# Patient Record
Sex: FEMALE | Race: OTHER | NOT HISPANIC OR LATINO | URBAN - METROPOLITAN AREA
[De-identification: names, ages, dates, MRNs, and addresses within clinical notes are randomized per-mention and may not be internally consistent; named-entity substitution may affect disease eponyms.]

---

## 2017-01-06 ENCOUNTER — OUTPATIENT (OUTPATIENT)
Dept: OUTPATIENT SERVICES | Facility: HOSPITAL | Age: 52
LOS: 1 days | End: 2017-01-06
Payer: MEDICAID

## 2017-01-06 ENCOUNTER — APPOINTMENT (OUTPATIENT)
Dept: PODIATRY | Facility: HOSPITAL | Age: 52
End: 2017-01-06

## 2017-01-06 VITALS
HEIGHT: 63 IN | HEART RATE: 73 BPM | DIASTOLIC BLOOD PRESSURE: 78 MMHG | WEIGHT: 96 LBS | RESPIRATION RATE: 14 BRPM | BODY MASS INDEX: 17.01 KG/M2 | SYSTOLIC BLOOD PRESSURE: 120 MMHG

## 2017-01-06 DIAGNOSIS — Z98.89 OTHER SPECIFIED POSTPROCEDURAL STATES: Chronic | ICD-10-CM

## 2017-01-06 DIAGNOSIS — M20.42 OTHER HAMMER TOE(S) (ACQUIRED), LEFT FOOT: ICD-10-CM

## 2017-01-06 DIAGNOSIS — M20.12 HALLUX VALGUS (ACQUIRED), LEFT FOOT: ICD-10-CM

## 2017-01-06 DIAGNOSIS — M79.609 PAIN IN UNSPECIFIED LIMB: ICD-10-CM

## 2017-01-06 DIAGNOSIS — M79.672 PAIN IN LEFT FOOT: ICD-10-CM

## 2017-01-06 PROCEDURE — G0463: CPT

## 2017-01-10 ENCOUNTER — FORM ENCOUNTER (OUTPATIENT)
Age: 52
End: 2017-01-10

## 2017-01-11 ENCOUNTER — OUTPATIENT (OUTPATIENT)
Dept: OUTPATIENT SERVICES | Facility: HOSPITAL | Age: 52
LOS: 1 days | End: 2017-01-11
Payer: MEDICAID

## 2017-01-11 ENCOUNTER — APPOINTMENT (OUTPATIENT)
Dept: ORTHOPEDIC SURGERY | Facility: HOSPITAL | Age: 52
End: 2017-01-11

## 2017-01-11 VITALS
HEIGHT: 63 IN | SYSTOLIC BLOOD PRESSURE: 120 MMHG | HEART RATE: 74 BPM | WEIGHT: 96 LBS | BODY MASS INDEX: 17.01 KG/M2 | DIASTOLIC BLOOD PRESSURE: 76 MMHG | RESPIRATION RATE: 16 BRPM

## 2017-01-11 DIAGNOSIS — Z98.89 OTHER SPECIFIED POSTPROCEDURAL STATES: Chronic | ICD-10-CM

## 2017-01-11 DIAGNOSIS — M25.559 PAIN IN UNSPECIFIED HIP: ICD-10-CM

## 2017-01-11 DIAGNOSIS — M54.16 RADICULOPATHY, LUMBAR REGION: ICD-10-CM

## 2017-01-11 PROCEDURE — 72170 X-RAY EXAM OF PELVIS: CPT | Mod: 26

## 2017-01-11 PROCEDURE — 73552 X-RAY EXAM OF FEMUR 2/>: CPT

## 2017-01-11 PROCEDURE — G0463: CPT

## 2017-01-11 PROCEDURE — 72100 X-RAY EXAM L-S SPINE 2/3 VWS: CPT | Mod: 26

## 2017-01-11 PROCEDURE — 73552 X-RAY EXAM OF FEMUR 2/>: CPT | Mod: 26,LT

## 2017-01-11 PROCEDURE — 72170 X-RAY EXAM OF PELVIS: CPT

## 2017-01-11 PROCEDURE — 72100 X-RAY EXAM L-S SPINE 2/3 VWS: CPT

## 2017-01-24 ENCOUNTER — OUTPATIENT (OUTPATIENT)
Dept: OUTPATIENT SERVICES | Facility: HOSPITAL | Age: 52
LOS: 1 days | End: 2017-01-24

## 2017-01-24 ENCOUNTER — APPOINTMENT (OUTPATIENT)
Dept: INTERNAL MEDICINE | Facility: HOSPITAL | Age: 52
End: 2017-01-24

## 2017-01-24 ENCOUNTER — MED ADMIN CHARGE (OUTPATIENT)
Age: 52
End: 2017-01-24

## 2017-01-24 VITALS — DIASTOLIC BLOOD PRESSURE: 73 MMHG | SYSTOLIC BLOOD PRESSURE: 114 MMHG | HEART RATE: 79 BPM

## 2017-01-24 VITALS — WEIGHT: 103 LBS | HEIGHT: 63 IN | BODY MASS INDEX: 18.25 KG/M2

## 2017-01-24 DIAGNOSIS — Z98.89 OTHER SPECIFIED POSTPROCEDURAL STATES: Chronic | ICD-10-CM

## 2017-01-25 DIAGNOSIS — Z98.890 OTHER SPECIFIED POSTPROCEDURAL STATES: ICD-10-CM

## 2017-01-25 DIAGNOSIS — M79.7 FIBROMYALGIA: ICD-10-CM

## 2017-01-25 DIAGNOSIS — F32.9 MAJOR DEPRESSIVE DISORDER, SINGLE EPISODE, UNSPECIFIED: ICD-10-CM

## 2017-01-25 DIAGNOSIS — Z00.00 ENCOUNTER FOR GENERAL ADULT MEDICAL EXAMINATION WITHOUT ABNORMAL FINDINGS: ICD-10-CM

## 2017-01-25 DIAGNOSIS — Z23 ENCOUNTER FOR IMMUNIZATION: ICD-10-CM

## 2017-01-25 DIAGNOSIS — A80.9 ACUTE POLIOMYELITIS, UNSPECIFIED: ICD-10-CM

## 2017-02-01 ENCOUNTER — APPOINTMENT (OUTPATIENT)
Dept: ORTHOPEDIC SURGERY | Facility: HOSPITAL | Age: 52
End: 2017-02-01

## 2017-02-01 ENCOUNTER — OUTPATIENT (OUTPATIENT)
Dept: OUTPATIENT SERVICES | Facility: HOSPITAL | Age: 52
LOS: 1 days | End: 2017-02-01
Payer: MEDICAID

## 2017-02-01 VITALS
DIASTOLIC BLOOD PRESSURE: 74 MMHG | HEIGHT: 63 IN | HEART RATE: 81 BPM | RESPIRATION RATE: 16 BRPM | BODY MASS INDEX: 18.25 KG/M2 | SYSTOLIC BLOOD PRESSURE: 122 MMHG | WEIGHT: 103 LBS | TEMPERATURE: 97.3 F

## 2017-02-01 DIAGNOSIS — M25.559 PAIN IN UNSPECIFIED HIP: ICD-10-CM

## 2017-02-01 DIAGNOSIS — Z98.89 OTHER SPECIFIED POSTPROCEDURAL STATES: Chronic | ICD-10-CM

## 2017-02-01 PROCEDURE — G0463: CPT

## 2017-02-03 ENCOUNTER — APPOINTMENT (OUTPATIENT)
Dept: PODIATRY | Facility: HOSPITAL | Age: 52
End: 2017-02-03

## 2017-02-07 DIAGNOSIS — M54.16 RADICULOPATHY, LUMBAR REGION: ICD-10-CM

## 2017-02-23 ENCOUNTER — APPOINTMENT (OUTPATIENT)
Dept: OBGYN | Facility: HOSPITAL | Age: 52
End: 2017-02-23

## 2017-03-10 ENCOUNTER — RX RENEWAL (OUTPATIENT)
Age: 52
End: 2017-03-10

## 2017-04-07 ENCOUNTER — APPOINTMENT (OUTPATIENT)
Age: 52
End: 2017-04-07

## 2017-04-21 ENCOUNTER — APPOINTMENT (OUTPATIENT)
Dept: PODIATRY | Facility: HOSPITAL | Age: 52
End: 2017-04-21

## 2017-06-30 ENCOUNTER — OUTPATIENT (OUTPATIENT)
Dept: OUTPATIENT SERVICES | Facility: HOSPITAL | Age: 52
LOS: 1 days | End: 2017-06-30
Payer: MEDICAID

## 2017-06-30 ENCOUNTER — APPOINTMENT (OUTPATIENT)
Dept: PODIATRY | Facility: HOSPITAL | Age: 52
End: 2017-06-30

## 2017-06-30 VITALS
HEIGHT: 63 IN | RESPIRATION RATE: 14 BRPM | SYSTOLIC BLOOD PRESSURE: 106 MMHG | HEART RATE: 78 BPM | WEIGHT: 103 LBS | DIASTOLIC BLOOD PRESSURE: 69 MMHG | BODY MASS INDEX: 18.25 KG/M2

## 2017-06-30 DIAGNOSIS — Z98.89 OTHER SPECIFIED POSTPROCEDURAL STATES: Chronic | ICD-10-CM

## 2017-06-30 DIAGNOSIS — M72.2 PLANTAR FASCIAL FIBROMATOSIS: ICD-10-CM

## 2017-06-30 DIAGNOSIS — M79.672 PAIN IN LEFT FOOT: ICD-10-CM

## 2017-06-30 DIAGNOSIS — M79.609 PAIN IN UNSPECIFIED LIMB: ICD-10-CM

## 2017-06-30 PROCEDURE — G0463: CPT

## 2017-07-20 ENCOUNTER — LABORATORY RESULT (OUTPATIENT)
Age: 52
End: 2017-07-20

## 2017-07-20 ENCOUNTER — APPOINTMENT (OUTPATIENT)
Dept: INTERNAL MEDICINE | Facility: HOSPITAL | Age: 52
End: 2017-07-20

## 2017-07-20 ENCOUNTER — OUTPATIENT (OUTPATIENT)
Dept: OUTPATIENT SERVICES | Facility: HOSPITAL | Age: 52
LOS: 1 days | End: 2017-07-20

## 2017-07-20 DIAGNOSIS — Z98.89 OTHER SPECIFIED POSTPROCEDURAL STATES: Chronic | ICD-10-CM

## 2017-07-20 LAB
BASOPHILS # BLD AUTO: 0.05 K/UL — SIGNIFICANT CHANGE UP (ref 0–0.2)
BASOPHILS NFR BLD AUTO: 0.9 % — SIGNIFICANT CHANGE UP (ref 0–2)
BUN SERPL-MCNC: 12 MG/DL — SIGNIFICANT CHANGE UP (ref 7–23)
CALCIUM SERPL-MCNC: 9 MG/DL — SIGNIFICANT CHANGE UP (ref 8.4–10.5)
CHLORIDE SERPL-SCNC: 103 MMOL/L — SIGNIFICANT CHANGE UP (ref 98–107)
CO2 SERPL-SCNC: 26 MMOL/L — SIGNIFICANT CHANGE UP (ref 22–31)
CREAT SERPL-MCNC: 0.58 MG/DL — SIGNIFICANT CHANGE UP (ref 0.5–1.3)
EOSINOPHIL # BLD AUTO: 0.27 K/UL — SIGNIFICANT CHANGE UP (ref 0–0.5)
EOSINOPHIL NFR BLD AUTO: 4.9 % — SIGNIFICANT CHANGE UP (ref 0–6)
GLUCOSE SERPL-MCNC: 81 MG/DL — SIGNIFICANT CHANGE UP (ref 70–99)
HBA1C BLD-MCNC: 5.3 % — SIGNIFICANT CHANGE UP (ref 4–5.6)
HCT VFR BLD CALC: 34.7 % — SIGNIFICANT CHANGE UP (ref 34.5–45)
HGB BLD-MCNC: 11.5 G/DL — SIGNIFICANT CHANGE UP (ref 11.5–15.5)
IMM GRANULOCYTES # BLD AUTO: 0.01 # — SIGNIFICANT CHANGE UP
IMM GRANULOCYTES NFR BLD AUTO: 0.2 % — SIGNIFICANT CHANGE UP (ref 0–1.5)
LYMPHOCYTES # BLD AUTO: 2.31 K/UL — SIGNIFICANT CHANGE UP (ref 1–3.3)
LYMPHOCYTES # BLD AUTO: 42.3 % — SIGNIFICANT CHANGE UP (ref 13–44)
MCHC RBC-ENTMCNC: 29.9 PG — SIGNIFICANT CHANGE UP (ref 27–34)
MCHC RBC-ENTMCNC: 33.1 % — SIGNIFICANT CHANGE UP (ref 32–36)
MCV RBC AUTO: 90.1 FL — SIGNIFICANT CHANGE UP (ref 80–100)
MONOCYTES # BLD AUTO: 0.39 K/UL — SIGNIFICANT CHANGE UP (ref 0–0.9)
MONOCYTES NFR BLD AUTO: 7.1 % — SIGNIFICANT CHANGE UP (ref 2–14)
NEUTROPHILS # BLD AUTO: 2.43 K/UL — SIGNIFICANT CHANGE UP (ref 1.8–7.4)
NEUTROPHILS NFR BLD AUTO: 44.6 % — SIGNIFICANT CHANGE UP (ref 43–77)
NRBC # FLD: 0 — SIGNIFICANT CHANGE UP
PLATELET # BLD AUTO: 236 K/UL — SIGNIFICANT CHANGE UP (ref 150–400)
PMV BLD: 12.4 FL — SIGNIFICANT CHANGE UP (ref 7–13)
POTASSIUM SERPL-MCNC: 3.5 MMOL/L — SIGNIFICANT CHANGE UP (ref 3.5–5.3)
POTASSIUM SERPL-SCNC: 3.5 MMOL/L — SIGNIFICANT CHANGE UP (ref 3.5–5.3)
RBC # BLD: 3.85 M/UL — SIGNIFICANT CHANGE UP (ref 3.8–5.2)
RBC # FLD: 13.4 % — SIGNIFICANT CHANGE UP (ref 10.3–14.5)
RHEUMATOID FACT SERPL-ACNC: < 7 IU/ML — SIGNIFICANT CHANGE UP
SODIUM SERPL-SCNC: 140 MMOL/L — SIGNIFICANT CHANGE UP (ref 135–145)
TSH SERPL-MCNC: 1.47 UIU/ML — SIGNIFICANT CHANGE UP (ref 0.27–4.2)
WBC # BLD: 5.46 K/UL — SIGNIFICANT CHANGE UP (ref 3.8–10.5)
WBC # FLD AUTO: 5.46 K/UL — SIGNIFICANT CHANGE UP (ref 3.8–10.5)

## 2017-07-21 VITALS — SYSTOLIC BLOOD PRESSURE: 88 MMHG | HEART RATE: 74 BPM | DIASTOLIC BLOOD PRESSURE: 62 MMHG

## 2017-07-24 DIAGNOSIS — G14 POSTPOLIO SYNDROME: ICD-10-CM

## 2017-07-24 DIAGNOSIS — Z00.00 ENCOUNTER FOR GENERAL ADULT MEDICAL EXAMINATION WITHOUT ABNORMAL FINDINGS: ICD-10-CM

## 2017-07-24 LAB — CCP AB SER-ACNC: <8 — SIGNIFICANT CHANGE UP

## 2017-08-11 ENCOUNTER — APPOINTMENT (OUTPATIENT)
Dept: PODIATRY | Facility: HOSPITAL | Age: 52
End: 2017-08-11

## 2017-09-20 ENCOUNTER — APPOINTMENT (OUTPATIENT)
Dept: OBGYN | Facility: HOSPITAL | Age: 52
End: 2017-09-20

## 2017-11-03 ENCOUNTER — OUTPATIENT (OUTPATIENT)
Dept: OUTPATIENT SERVICES | Facility: HOSPITAL | Age: 52
LOS: 1 days | End: 2017-11-03

## 2017-11-03 ENCOUNTER — APPOINTMENT (OUTPATIENT)
Dept: INTERNAL MEDICINE | Facility: HOSPITAL | Age: 52
End: 2017-11-03
Payer: MEDICAID

## 2017-11-03 VITALS — HEIGHT: 63 IN | WEIGHT: 96 LBS | BODY MASS INDEX: 17.01 KG/M2

## 2017-11-03 VITALS — DIASTOLIC BLOOD PRESSURE: 80 MMHG | HEART RATE: 66 BPM | SYSTOLIC BLOOD PRESSURE: 110 MMHG

## 2017-11-03 DIAGNOSIS — Z91.018 ALLERGY TO OTHER FOODS: ICD-10-CM

## 2017-11-03 DIAGNOSIS — L60.0 INGROWING NAIL: ICD-10-CM

## 2017-11-03 DIAGNOSIS — M21.612 HALLUX VALGUS (ACQUIRED), LEFT FOOT: ICD-10-CM

## 2017-11-03 DIAGNOSIS — M72.2 PLANTAR FASCIAL FIBROMATOSIS: ICD-10-CM

## 2017-11-03 DIAGNOSIS — M20.12 HALLUX VALGUS (ACQUIRED), LEFT FOOT: ICD-10-CM

## 2017-11-03 DIAGNOSIS — M20.42 OTHER HAMMER TOE(S) (ACQUIRED), LEFT FOOT: ICD-10-CM

## 2017-11-03 DIAGNOSIS — Z98.890 OTHER SPECIFIED POSTPROCEDURAL STATES: ICD-10-CM

## 2017-11-03 DIAGNOSIS — F32.9 MAJOR DEPRESSIVE DISORDER, SINGLE EPISODE, UNSPECIFIED: ICD-10-CM

## 2017-11-03 DIAGNOSIS — R06.02 SHORTNESS OF BREATH: ICD-10-CM

## 2017-11-03 DIAGNOSIS — Z86.19 PERSONAL HISTORY OF OTHER INFECTIOUS AND PARASITIC DISEASES: ICD-10-CM

## 2017-11-03 DIAGNOSIS — Z86.69 PERSONAL HISTORY OF OTHER DISEASES OF THE NERVOUS SYSTEM AND SENSE ORGANS: ICD-10-CM

## 2017-11-03 DIAGNOSIS — G14 POSTPOLIO SYNDROME: ICD-10-CM

## 2017-11-03 DIAGNOSIS — Z98.89 OTHER SPECIFIED POSTPROCEDURAL STATES: Chronic | ICD-10-CM

## 2017-11-03 DIAGNOSIS — Z87.09 PERSONAL HISTORY OF OTHER DISEASES OF THE RESPIRATORY SYSTEM: ICD-10-CM

## 2017-11-03 DIAGNOSIS — Z00.00 ENCOUNTER FOR GENERAL ADULT MEDICAL EXAMINATION WITHOUT ABNORMAL FINDINGS: ICD-10-CM

## 2017-11-03 DIAGNOSIS — Z01.818 ENCOUNTER FOR OTHER PREPROCEDURAL EXAMINATION: ICD-10-CM

## 2017-11-03 PROCEDURE — 99213 OFFICE O/P EST LOW 20 MIN: CPT | Mod: GC

## 2017-11-10 ENCOUNTER — APPOINTMENT (OUTPATIENT)
Dept: PODIATRY | Facility: HOSPITAL | Age: 52
End: 2017-11-10

## 2017-11-10 ENCOUNTER — OUTPATIENT (OUTPATIENT)
Dept: OUTPATIENT SERVICES | Facility: HOSPITAL | Age: 52
LOS: 1 days | End: 2017-11-10
Payer: MEDICAID

## 2017-11-10 VITALS
HEIGHT: 63 IN | BODY MASS INDEX: 17.01 KG/M2 | RESPIRATION RATE: 16 BRPM | WEIGHT: 96 LBS | DIASTOLIC BLOOD PRESSURE: 77 MMHG | SYSTOLIC BLOOD PRESSURE: 113 MMHG | HEART RATE: 85 BPM

## 2017-11-10 DIAGNOSIS — Z98.89 OTHER SPECIFIED POSTPROCEDURAL STATES: Chronic | ICD-10-CM

## 2017-11-10 DIAGNOSIS — M79.609 PAIN IN UNSPECIFIED LIMB: ICD-10-CM

## 2017-11-10 DIAGNOSIS — M21.619 BUNION OF UNSPECIFIED FOOT: ICD-10-CM

## 2017-11-10 DIAGNOSIS — M21.6X9 OTHER ACQUIRED DEFORMITIES OF UNSPECIFIED FOOT: ICD-10-CM

## 2017-11-10 PROCEDURE — 73630 X-RAY EXAM OF FOOT: CPT

## 2017-11-10 PROCEDURE — 73630 X-RAY EXAM OF FOOT: CPT | Mod: 26,LT

## 2017-11-10 PROCEDURE — G0463: CPT

## 2017-11-22 ENCOUNTER — APPOINTMENT (OUTPATIENT)
Dept: MAMMOGRAPHY | Facility: IMAGING CENTER | Age: 52
End: 2017-11-22
Payer: MEDICAID

## 2017-11-22 ENCOUNTER — OUTPATIENT (OUTPATIENT)
Dept: OUTPATIENT SERVICES | Facility: HOSPITAL | Age: 52
LOS: 1 days | End: 2017-11-22
Payer: MEDICAID

## 2017-11-22 DIAGNOSIS — Z98.89 OTHER SPECIFIED POSTPROCEDURAL STATES: Chronic | ICD-10-CM

## 2017-11-22 DIAGNOSIS — Z00.8 ENCOUNTER FOR OTHER GENERAL EXAMINATION: ICD-10-CM

## 2017-11-22 PROCEDURE — 77067 SCR MAMMO BI INCL CAD: CPT

## 2017-11-22 PROCEDURE — 77063 BREAST TOMOSYNTHESIS BI: CPT | Mod: 26

## 2017-11-22 PROCEDURE — G0202: CPT | Mod: 26

## 2017-11-22 PROCEDURE — 77063 BREAST TOMOSYNTHESIS BI: CPT

## 2017-12-01 ENCOUNTER — APPOINTMENT (OUTPATIENT)
Dept: PODIATRY | Facility: HOSPITAL | Age: 52
End: 2017-12-01

## 2017-12-13 ENCOUNTER — APPOINTMENT (OUTPATIENT)
Dept: ORTHOPEDIC SURGERY | Facility: HOSPITAL | Age: 52
End: 2017-12-13

## 2018-01-10 ENCOUNTER — APPOINTMENT (OUTPATIENT)
Dept: INTERNAL MEDICINE | Facility: HOSPITAL | Age: 53
End: 2018-01-10

## 2018-04-23 ENCOUNTER — APPOINTMENT (OUTPATIENT)
Dept: INTERNAL MEDICINE | Facility: HOSPITAL | Age: 53
End: 2018-04-23

## 2018-05-11 ENCOUNTER — APPOINTMENT (OUTPATIENT)
Dept: OPHTHALMOLOGY | Facility: CLINIC | Age: 53
End: 2018-05-11

## 2018-06-21 ENCOUNTER — APPOINTMENT (OUTPATIENT)
Dept: OPHTHALMOLOGY | Facility: CLINIC | Age: 53
End: 2018-06-21

## 2018-08-09 ENCOUNTER — LABORATORY RESULT (OUTPATIENT)
Age: 53
End: 2018-08-09

## 2018-08-09 ENCOUNTER — APPOINTMENT (OUTPATIENT)
Dept: INTERNAL MEDICINE | Facility: HOSPITAL | Age: 53
End: 2018-08-09
Payer: MEDICAID

## 2018-08-09 ENCOUNTER — OUTPATIENT (OUTPATIENT)
Dept: OUTPATIENT SERVICES | Facility: HOSPITAL | Age: 53
LOS: 1 days | End: 2018-08-09

## 2018-08-09 VITALS — BODY MASS INDEX: 19.49 KG/M2 | WEIGHT: 110 LBS

## 2018-08-09 VITALS — DIASTOLIC BLOOD PRESSURE: 72 MMHG | SYSTOLIC BLOOD PRESSURE: 106 MMHG

## 2018-08-09 VITALS — WEIGHT: 110 LBS | HEIGHT: 63 IN | BODY MASS INDEX: 19.49 KG/M2

## 2018-08-09 DIAGNOSIS — Z98.89 OTHER SPECIFIED POSTPROCEDURAL STATES: Chronic | ICD-10-CM

## 2018-08-09 DIAGNOSIS — M79.672 PAIN IN LEFT FOOT: ICD-10-CM

## 2018-08-09 DIAGNOSIS — R63.6 UNDERWEIGHT: ICD-10-CM

## 2018-08-09 LAB
BASOPHILS # BLD AUTO: 0.05 K/UL — SIGNIFICANT CHANGE UP (ref 0–0.2)
BASOPHILS NFR BLD AUTO: 0.7 % — SIGNIFICANT CHANGE UP (ref 0–2)
BUN SERPL-MCNC: 12 MG/DL — SIGNIFICANT CHANGE UP (ref 7–23)
CALCIUM SERPL-MCNC: 9.3 MG/DL — SIGNIFICANT CHANGE UP (ref 8.4–10.5)
CHLORIDE SERPL-SCNC: 103 MMOL/L — SIGNIFICANT CHANGE UP (ref 98–107)
CO2 SERPL-SCNC: 26 MMOL/L — SIGNIFICANT CHANGE UP (ref 22–31)
CREAT SERPL-MCNC: 0.53 MG/DL — SIGNIFICANT CHANGE UP (ref 0.5–1.3)
EOSINOPHIL # BLD AUTO: 0.37 K/UL — SIGNIFICANT CHANGE UP (ref 0–0.5)
EOSINOPHIL NFR BLD AUTO: 5.5 % — SIGNIFICANT CHANGE UP (ref 0–6)
GLUCOSE SERPL-MCNC: 86 MG/DL — SIGNIFICANT CHANGE UP (ref 70–99)
HBA1C BLD-MCNC: 5.2 % — SIGNIFICANT CHANGE UP (ref 4–5.6)
HCT VFR BLD CALC: 39.1 % — SIGNIFICANT CHANGE UP (ref 34.5–45)
HGB BLD-MCNC: 12.5 G/DL — SIGNIFICANT CHANGE UP (ref 11.5–15.5)
IMM GRANULOCYTES # BLD AUTO: 0.02 # — SIGNIFICANT CHANGE UP
IMM GRANULOCYTES NFR BLD AUTO: 0.3 % — SIGNIFICANT CHANGE UP (ref 0–1.5)
LYMPHOCYTES # BLD AUTO: 3 K/UL — SIGNIFICANT CHANGE UP (ref 1–3.3)
LYMPHOCYTES # BLD AUTO: 44.3 % — HIGH (ref 13–44)
MCHC RBC-ENTMCNC: 29.3 PG — SIGNIFICANT CHANGE UP (ref 27–34)
MCHC RBC-ENTMCNC: 32 % — SIGNIFICANT CHANGE UP (ref 32–36)
MCV RBC AUTO: 91.6 FL — SIGNIFICANT CHANGE UP (ref 80–100)
MONOCYTES # BLD AUTO: 0.34 K/UL — SIGNIFICANT CHANGE UP (ref 0–0.9)
MONOCYTES NFR BLD AUTO: 5 % — SIGNIFICANT CHANGE UP (ref 2–14)
NEUTROPHILS # BLD AUTO: 2.99 K/UL — SIGNIFICANT CHANGE UP (ref 1.8–7.4)
NEUTROPHILS NFR BLD AUTO: 44.2 % — SIGNIFICANT CHANGE UP (ref 43–77)
NRBC # FLD: 0 — SIGNIFICANT CHANGE UP
PLATELET # BLD AUTO: 264 K/UL — SIGNIFICANT CHANGE UP (ref 150–400)
PMV BLD: 11.9 FL — SIGNIFICANT CHANGE UP (ref 7–13)
POTASSIUM SERPL-MCNC: 3.6 MMOL/L — SIGNIFICANT CHANGE UP (ref 3.5–5.3)
POTASSIUM SERPL-SCNC: 3.6 MMOL/L — SIGNIFICANT CHANGE UP (ref 3.5–5.3)
RBC # BLD: 4.27 M/UL — SIGNIFICANT CHANGE UP (ref 3.8–5.2)
RBC # FLD: 12.5 % — SIGNIFICANT CHANGE UP (ref 10.3–14.5)
SODIUM SERPL-SCNC: 142 MMOL/L — SIGNIFICANT CHANGE UP (ref 135–145)
WBC # BLD: 6.77 K/UL — SIGNIFICANT CHANGE UP (ref 3.8–10.5)
WBC # FLD AUTO: 6.77 K/UL — SIGNIFICANT CHANGE UP (ref 3.8–10.5)

## 2018-08-09 PROCEDURE — 99214 OFFICE O/P EST MOD 30 MIN: CPT | Mod: GC

## 2018-08-10 LAB — 24R-OH-CALCIDIOL SERPL-MCNC: 17.3 NG/ML — LOW (ref 30–80)

## 2018-08-14 NOTE — PHYSICAL EXAM
[No Acute Distress] : no acute distress [Well Nourished] : well nourished [Normal Sclera/Conjunctiva] : normal sclera/conjunctiva [PERRL] : pupils equal round and reactive to light [EOMI] : extraocular movements intact [Normal Oropharynx] : the oropharynx was normal [No JVD] : no jugular venous distention [Supple] : supple [Thyroid Normal, No Nodules] : the thyroid was normal and there were no nodules present [No Respiratory Distress] : no respiratory distress  [Clear to Auscultation] : lungs were clear to auscultation bilaterally [No Accessory Muscle Use] : no accessory muscle use [Normal Rate] : normal rate  [Regular Rhythm] : with a regular rhythm [Normal S1, S2] : normal S1 and S2 [No Extremity Clubbing/Cyanosis] : no extremity clubbing/cyanosis [Soft] : abdomen soft [Non-distended] : non-distended [Normal Bowel Sounds] : normal bowel sounds [Normal Posterior Cervical Nodes] : no posterior cervical lymphadenopathy [Normal Anterior Cervical Nodes] : no anterior cervical lymphadenopathy [No CVA Tenderness] : no CVA  tenderness [No Spinal Tenderness] : no spinal tenderness [Acne] : acne [Normal Insight/Judgement] : insight and judgment were intact [de-identified] : wheel-chair bound with right leg orthotic brace [de-identified] : Dysphonia; s/p cleft palate repair [de-identified] : Breath sounds diminished diffusely; no wheeze [de-identified] : No L pedal edema [de-identified] : Deferred [FreeTextEntry1] : D [de-identified] : Large PIP joints; large MCP joints in 1st digit bilaterally; hypotonia in RLE; grossly normal strength in LLE [de-identified] : Pigmented and excoriated lesions over face [de-identified] : Unable to assess gait; RLE- strength 2/5; LLE - strength 5/5 but limited by pain [de-identified] : Teary during clinical encounter

## 2018-08-14 NOTE — HEALTH RISK ASSESSMENT
[Good] : ~his/her~  mood as  good [No falls in past year] : Patient reported no falls in the past year [0] : 2) Feeling down, depressed, or hopeless: Not at all (0) [Patient reported mammogram was normal] : Patient reported mammogram was normal [FreeTextEntry1] : Decreased appetite, difficulty with ambulation, menopause [] : No [de-identified] : Surgery [Change in mental status noted] : No change in mental status noted [MammogramDate] : 11/2017 [de-identified] : Phonation due to cleft palate

## 2018-08-14 NOTE — HISTORY OF PRESENT ILLNESS
[de-identified] : 53F with postpolio syndrome, wheelchair-bound, h/o congenital cleft palate s/p repair presents for comprehensive visit.\par \par Postpolio: pain involving R. hip and L. foot unchanged, controlled with biweekly PT ([facility]) which she enjoys. Patient also takes ibuprofen 400 x2 tabs daily. She requests commode for toileting and folding walker to help her ambulate within the home. Seen by Surgery in 11/2017 for residual L. foot/arch pain after surgery for L. hammer toes. X-rays of L. foot recommended but not done. Patient has not followed up since.\par \par Depression: denies depressed mood, decreased pleasure/interest in daily activities. No SI. She lives with her supportive daughter.\par \par SOB: patient continue to use inhaler approx. twice daily. Has been referred multiple times to Pulmonology without follow up. Explained to patient why it is important to determine underlying cause of her respiratory symptoms in addition to managing symptoms. She has dry cough at baseline. No fever, chills, sore throat, generalized muscle pain. \par \par HCM: Mammogram 01/2018 normal. Patient reports irregular menstrual cycle. She recently menstruated for 3-4 week period after 5 months without menstrual cycles. She also endorsed increased lower abdominal cramping, breast tenderness and acne - symptoms she never associated with menstruation in the past. \par

## 2018-08-15 DIAGNOSIS — M79.672 PAIN IN LEFT FOOT: ICD-10-CM

## 2018-08-15 DIAGNOSIS — E55.9 VITAMIN D DEFICIENCY, UNSPECIFIED: ICD-10-CM

## 2018-08-15 DIAGNOSIS — R63.6 UNDERWEIGHT: ICD-10-CM

## 2018-08-15 DIAGNOSIS — G14 POSTPOLIO SYNDROME: ICD-10-CM

## 2018-08-15 DIAGNOSIS — R06.02 SHORTNESS OF BREATH: ICD-10-CM

## 2018-08-15 DIAGNOSIS — F32.9 MAJOR DEPRESSIVE DISORDER, SINGLE EPISODE, UNSPECIFIED: ICD-10-CM

## 2018-09-01 ENCOUNTER — OUTPATIENT (OUTPATIENT)
Dept: OUTPATIENT SERVICES | Facility: HOSPITAL | Age: 53
LOS: 1 days | End: 2018-09-01
Payer: MEDICAID

## 2018-09-01 DIAGNOSIS — Z98.89 OTHER SPECIFIED POSTPROCEDURAL STATES: Chronic | ICD-10-CM

## 2018-09-01 PROCEDURE — G9001: CPT

## 2018-09-12 ENCOUNTER — APPOINTMENT (OUTPATIENT)
Dept: OBGYN | Facility: HOSPITAL | Age: 53
End: 2018-09-12

## 2018-09-12 ENCOUNTER — EMERGENCY (EMERGENCY)
Facility: HOSPITAL | Age: 53
LOS: 1 days | Discharge: HOME | End: 2018-09-12
Attending: EMERGENCY MEDICINE | Admitting: EMERGENCY MEDICINE
Payer: MEDICAID

## 2018-09-12 VITALS
RESPIRATION RATE: 16 BRPM | TEMPERATURE: 98 F | OXYGEN SATURATION: 100 % | DIASTOLIC BLOOD PRESSURE: 67 MMHG | SYSTOLIC BLOOD PRESSURE: 125 MMHG | HEART RATE: 76 BPM

## 2018-09-12 VITALS
BODY MASS INDEX: 17.01 KG/M2 | WEIGHT: 96 LBS | DIASTOLIC BLOOD PRESSURE: 66 MMHG | SYSTOLIC BLOOD PRESSURE: 116 MMHG | HEART RATE: 72 BPM

## 2018-09-12 VITALS
RESPIRATION RATE: 18 BRPM | OXYGEN SATURATION: 100 % | TEMPERATURE: 98 F | SYSTOLIC BLOOD PRESSURE: 100 MMHG | HEART RATE: 66 BPM | DIASTOLIC BLOOD PRESSURE: 58 MMHG

## 2018-09-12 DIAGNOSIS — Z98.89 OTHER SPECIFIED POSTPROCEDURAL STATES: Chronic | ICD-10-CM

## 2018-09-12 LAB
ALBUMIN SERPL ELPH-MCNC: 4.2 G/DL — SIGNIFICANT CHANGE UP (ref 3.3–5)
ALP SERPL-CCNC: 42 U/L — SIGNIFICANT CHANGE UP (ref 40–120)
ALT FLD-CCNC: 19 U/L — SIGNIFICANT CHANGE UP (ref 4–33)
AST SERPL-CCNC: 25 U/L — SIGNIFICANT CHANGE UP (ref 4–32)
BASOPHILS # BLD AUTO: 0.06 K/UL — SIGNIFICANT CHANGE UP (ref 0–0.2)
BASOPHILS NFR BLD AUTO: 0.7 % — SIGNIFICANT CHANGE UP (ref 0–2)
BILIRUB SERPL-MCNC: 0.3 MG/DL — SIGNIFICANT CHANGE UP (ref 0.2–1.2)
BUN SERPL-MCNC: 11 MG/DL — SIGNIFICANT CHANGE UP (ref 7–23)
CALCIUM SERPL-MCNC: 9.4 MG/DL — SIGNIFICANT CHANGE UP (ref 8.4–10.5)
CHLORIDE SERPL-SCNC: 105 MMOL/L — SIGNIFICANT CHANGE UP (ref 98–107)
CO2 SERPL-SCNC: 26 MMOL/L — SIGNIFICANT CHANGE UP (ref 22–31)
CREAT SERPL-MCNC: 0.52 MG/DL — SIGNIFICANT CHANGE UP (ref 0.5–1.3)
EOSINOPHIL # BLD AUTO: 0.42 K/UL — SIGNIFICANT CHANGE UP (ref 0–0.5)
EOSINOPHIL NFR BLD AUTO: 4.9 % — SIGNIFICANT CHANGE UP (ref 0–6)
GLUCOSE SERPL-MCNC: 96 MG/DL — SIGNIFICANT CHANGE UP (ref 70–99)
HCT VFR BLD CALC: 42.4 % — SIGNIFICANT CHANGE UP (ref 34.5–45)
HGB BLD-MCNC: 13.5 G/DL — SIGNIFICANT CHANGE UP (ref 11.5–15.5)
IMM GRANULOCYTES # BLD AUTO: 0.02 # — SIGNIFICANT CHANGE UP
IMM GRANULOCYTES NFR BLD AUTO: 0.2 % — SIGNIFICANT CHANGE UP (ref 0–1.5)
LIDOCAIN IGE QN: 53.4 U/L — SIGNIFICANT CHANGE UP (ref 7–60)
LYMPHOCYTES # BLD AUTO: 3.92 K/UL — HIGH (ref 1–3.3)
LYMPHOCYTES # BLD AUTO: 45.3 % — HIGH (ref 13–44)
MCHC RBC-ENTMCNC: 29.1 PG — SIGNIFICANT CHANGE UP (ref 27–34)
MCHC RBC-ENTMCNC: 31.8 % — LOW (ref 32–36)
MCV RBC AUTO: 91.4 FL — SIGNIFICANT CHANGE UP (ref 80–100)
MONOCYTES # BLD AUTO: 0.49 K/UL — SIGNIFICANT CHANGE UP (ref 0–0.9)
MONOCYTES NFR BLD AUTO: 5.7 % — SIGNIFICANT CHANGE UP (ref 2–14)
NEUTROPHILS # BLD AUTO: 3.74 K/UL — SIGNIFICANT CHANGE UP (ref 1.8–7.4)
NEUTROPHILS NFR BLD AUTO: 43.2 % — SIGNIFICANT CHANGE UP (ref 43–77)
NRBC # FLD: 0 — SIGNIFICANT CHANGE UP
PLATELET # BLD AUTO: 313 K/UL — SIGNIFICANT CHANGE UP (ref 150–400)
PMV BLD: 11.5 FL — SIGNIFICANT CHANGE UP (ref 7–13)
POTASSIUM SERPL-MCNC: 3.5 MMOL/L — SIGNIFICANT CHANGE UP (ref 3.5–5.3)
POTASSIUM SERPL-SCNC: 3.5 MMOL/L — SIGNIFICANT CHANGE UP (ref 3.5–5.3)
PROT SERPL-MCNC: 7.4 G/DL — SIGNIFICANT CHANGE UP (ref 6–8.3)
RBC # BLD: 4.64 M/UL — SIGNIFICANT CHANGE UP (ref 3.8–5.2)
RBC # FLD: 12.4 % — SIGNIFICANT CHANGE UP (ref 10.3–14.5)
SODIUM SERPL-SCNC: 142 MMOL/L — SIGNIFICANT CHANGE UP (ref 135–145)
TROPONIN T, HIGH SENSITIVITY: < 6 NG/L — SIGNIFICANT CHANGE UP (ref ?–14)
WBC # BLD: 8.65 K/UL — SIGNIFICANT CHANGE UP (ref 3.8–10.5)
WBC # FLD AUTO: 8.65 K/UL — SIGNIFICANT CHANGE UP (ref 3.8–10.5)

## 2018-09-12 PROCEDURE — 99284 EMERGENCY DEPT VISIT MOD MDM: CPT | Mod: 25

## 2018-09-12 PROCEDURE — 71046 X-RAY EXAM CHEST 2 VIEWS: CPT | Mod: 26

## 2018-09-12 PROCEDURE — 93010 ELECTROCARDIOGRAM REPORT: CPT

## 2018-09-12 PROCEDURE — 71250 CT THORAX DX C-: CPT | Mod: 26

## 2018-09-12 RX ORDER — ACETAMINOPHEN 500 MG
650 TABLET ORAL ONCE
Qty: 0 | Refills: 0 | Status: COMPLETED | OUTPATIENT
Start: 2018-09-12 | End: 2018-09-12

## 2018-09-12 RX ORDER — FAMOTIDINE 10 MG/ML
20 INJECTION INTRAVENOUS ONCE
Qty: 0 | Refills: 0 | Status: COMPLETED | OUTPATIENT
Start: 2018-09-12 | End: 2018-09-12

## 2018-09-12 RX ORDER — SODIUM CHLORIDE 9 MG/ML
1000 INJECTION INTRAMUSCULAR; INTRAVENOUS; SUBCUTANEOUS ONCE
Qty: 0 | Refills: 0 | Status: COMPLETED | OUTPATIENT
Start: 2018-09-12 | End: 2018-09-12

## 2018-09-12 RX ORDER — FAMOTIDINE 10 MG/ML
1 INJECTION INTRAVENOUS
Qty: 60 | Refills: 0 | OUTPATIENT
Start: 2018-09-12 | End: 2018-10-11

## 2018-09-12 RX ORDER — SIMETHICONE 80 MG/1
1 TABLET, CHEWABLE ORAL
Qty: 60 | Refills: 0 | OUTPATIENT
Start: 2018-09-12 | End: 2018-10-11

## 2018-09-12 RX ADMIN — Medication 30 MILLILITER(S): at 17:57

## 2018-09-12 RX ADMIN — Medication 650 MILLIGRAM(S): at 17:57

## 2018-09-12 RX ADMIN — FAMOTIDINE 20 MILLIGRAM(S): 10 INJECTION INTRAVENOUS at 17:57

## 2018-09-12 RX ADMIN — SODIUM CHLORIDE 1000 MILLILITER(S): 9 INJECTION INTRAMUSCULAR; INTRAVENOUS; SUBCUTANEOUS at 17:57

## 2018-09-12 NOTE — ED PROVIDER NOTE - PROGRESS NOTE DETAILS
Patient states sx have improved s/p pepcid and maalox. Pending CT chest w/o con. Patient to be signed out and followed by Night team. Pt states feeling better. Tolerating PO. Discussed lab results and advised bland diet. Sent Pepcid to pharmacy and given GI f/u.

## 2018-09-12 NOTE — ED PROVIDER NOTE - ATTENDING CONTRIBUTION TO CARE
Pt was seen and evaluated by me. Pt is a 54 y/o female with PMHx of anxiety, polio, and cleft palate who presented to the ED for intermittent CP x 4 weeks. Pt states over the past 4 weeks having intermittent chest pain with belching. Pt denies any fever, chills, nausea, vomiting, SOB, or abd pain. Pt was seen at another facility for similar complaints and worked up and discharge. Pt was at clinic today who sent pt in for further eval. Pt admits to becoming more picky with her food and not eating much. Lungs CTA b/l. RRR. Abd soft, non-tender.

## 2018-09-12 NOTE — ED ADULT TRIAGE NOTE - CHIEF COMPLAINT QUOTE
pt brought to [facility] from the clinic here, c/o chest pain/back pain x 2 weeks and dizziness that started this morning. pt belching in triage pmhx: polio, cleft palate (causes slurred speech) pt brought to [facility] from the clinic here, c/o chest pain/back pain x 2 weeks, worse over the past few hours and dizziness that started this morning. frequent belching in triage pmhx: polio (has brace to right leg), cleft palate (causes slurred speech)

## 2018-09-12 NOTE — ED PROVIDER NOTE - PHYSICAL EXAMINATION
Vital signs reviewed.   CONSTITUTIONAL: Well-appearing; well-nourished; in no apparent distress. Non-toxic appearing.   HEAD: Normocephalic, atraumatic.  EYES: PERRL, EOM intact, conjunctiva and sclera WNL.  ENT: normal nose; no rhinorrhea; normal pharynx with no tonsillar hypertrophy, no erythema, no exudate, no lymphadenopathy.  NECK/LYMPH: Supple; non-tender; no cervical lymphadenopathy.  CARD: Normal S1, S2; no murmurs, rubs, or gallops noted.  RESP: Normal chest excursion with respiration; breath sounds clear and equal bilaterally; no wheezes, rhonchi, or rales.  ABD/GI: soft, non-distended; non-tender; no palpable organomegaly, no pulsatile mass. Negative salas sign.   EXT/MS: moves all extremities; distal pulses are normal, no pedal edema. No midline tenderness noted.  RLE brace noted.   SKIN: Normal for age and race; warm; dry; good turgor; no apparent lesions or exudate noted.  NEURO: Awake, alert, oriented x 3, no gross deficits  PSYCH: Normal mood; appropriate affect.;

## 2018-09-12 NOTE — ED PROVIDER NOTE - MEDICAL DECISION MAKING DETAILS
Pt is a 53 y.o female with PMHx of anxiety, polio (Rt LE brace), cleft palate who presents to ED c/o intermittent CP x 4 weeks that was worse today.  DDx- includes but not limited to; gastritis, GERD, r/o ACS   Plan- trop, ecg, cxr, labs, lipase, pepcid, maalox, fluids will monitor closely

## 2018-09-12 NOTE — ED PROVIDER NOTE - OBJECTIVE STATEMENT
HPI: Pt is a 53 y.o female with PMHx of anxiety, polio (Rt LE brace), cleft palate who presents to ED c/o intermittent CP x 4 weeks that was worse today. Patient states she has been having this intermittent sharp midsternal chest pain that radiates to back and is a/w dry cough and tactil fevers. Pt also admits to recent increased belching. Pt seen at clinic today and referred to ED. Pt denies FHx of CAD. Admits had cardiac work up many years ago that was benign. Also stated today she had slight headache. Denies taking anything for pain. Pt denies numbness or tingling, weakness, n/v/d, abdominal pain, dysuria, hematuria, back pain, neck pain, recent travel, hx smoking, LE swelling or any other complaints. Denies alcohol or drug use. HPI: Pt is a 53 y.o female with PMHx of anxiety, polio (Rt LE brace), cleft palate who presents to ED c/o intermittent CP x 4 weeks that was worse today. Patient states she has been having this intermittent sharp midsternal chest pain that radiates to back and is a/w dry cough and tactil fevers. Pt also admits to recent increased belching. Pt seen at clinic today and referred to ED. Pt denies FHx of CAD. Admits had cardiac work up many years ago that was benign. Also stated today she had slight headache. Denies taking anything for pain. Pt denies numbness or tingling, weakness, n/v/d, abdominal pain, dysuria, hematuria, back pain, neck pain, recent travel, hx smoking, LE swelling or any other complaints. Denies alcohol or drug use.    Patient lives at home with son, has home aide x 4 days and then goes to a center for two days. HPI: Pt is a 53 y.o female with PMHx of anxiety, polio (Rt LE brace), cleft palate who presents to ED c/o intermittent CP x 4 weeks that was worse today. Patient states she has been having this intermittent sharp midsternal chest pain that radiates to back and is a/w dry cough and tactil fevers. Pt also admits to recent increased belching. Pt seen at clinic today and referred to ED. Pt denies FHx of CAD. Admits had cardiac work up many years ago that was benign. Also stated today she had slight headache. Denies taking anything for pain. Pt seen recently at [facility] on sunday d/c home with diagnosis of viral URI. Pt denies numbness or tingling, weakness, n/v/d, abdominal pain, dysuria, hematuria, back pain, neck pain, recent travel, hx smoking, LE swelling or any other complaints. Denies alcohol or drug use.    Patient lives at home with son, has home aide x 4 days and then goes to a center for two days.

## 2018-09-21 DIAGNOSIS — Z71.89 OTHER SPECIFIED COUNSELING: ICD-10-CM

## 2018-09-27 ENCOUNTER — OUTPATIENT (OUTPATIENT)
Dept: OUTPATIENT SERVICES | Facility: HOSPITAL | Age: 53
LOS: 1 days | End: 2018-09-27

## 2018-09-27 ENCOUNTER — APPOINTMENT (OUTPATIENT)
Dept: INTERNAL MEDICINE | Facility: HOSPITAL | Age: 53
End: 2018-09-27
Payer: MEDICAID

## 2018-09-27 VITALS — SYSTOLIC BLOOD PRESSURE: 111 MMHG | HEART RATE: 75 BPM | DIASTOLIC BLOOD PRESSURE: 69 MMHG

## 2018-09-27 VITALS — WEIGHT: 110 LBS | BODY MASS INDEX: 19.49 KG/M2 | HEIGHT: 63 IN

## 2018-09-27 DIAGNOSIS — Z98.89 OTHER SPECIFIED POSTPROCEDURAL STATES: Chronic | ICD-10-CM

## 2018-09-27 PROCEDURE — 99214 OFFICE O/P EST MOD 30 MIN: CPT | Mod: GC

## 2018-09-28 DIAGNOSIS — G14 POSTPOLIO SYNDROME: ICD-10-CM

## 2018-09-28 DIAGNOSIS — E55.9 VITAMIN D DEFICIENCY, UNSPECIFIED: ICD-10-CM

## 2018-09-28 DIAGNOSIS — F32.9 MAJOR DEPRESSIVE DISORDER, SINGLE EPISODE, UNSPECIFIED: ICD-10-CM

## 2018-09-28 DIAGNOSIS — M79.7 FIBROMYALGIA: ICD-10-CM

## 2018-09-28 DIAGNOSIS — Z00.00 ENCOUNTER FOR GENERAL ADULT MEDICAL EXAMINATION WITHOUT ABNORMAL FINDINGS: ICD-10-CM

## 2018-09-28 DIAGNOSIS — K21.9 GASTRO-ESOPHAGEAL REFLUX DISEASE WITHOUT ESOPHAGITIS: ICD-10-CM

## 2018-09-28 NOTE — REVIEW OF SYSTEMS
[Cough] : cough [Fever] : no fever [Chills] : no chills [Chest Pain] : no chest pain [Palpitations] : no palpitations [Lower Ext Edema] : no lower extremity edema [Orthopnea] : no orthopnea [Wheezing] : no wheezing [Abdominal Pain] : no abdominal pain [Nausea] : no nausea [Vomiting] : no vomiting [Dysuria] : no dysuria [Dizziness] : no dizziness [Suicidal] : not suicidal

## 2018-09-28 NOTE — PHYSICAL EXAM
[No Acute Distress] : no acute distress [EOMI] : extraocular movements intact [No JVD] : no jugular venous distention [No Respiratory Distress] : no respiratory distress  [Clear to Auscultation] : lungs were clear to auscultation bilaterally [Normal Rate] : normal rate  [Regular Rhythm] : with a regular rhythm [Normal S1, S2] : normal S1 and S2 [de-identified] : Wheelchair bound [de-identified] : Point tenderness on shoulders at > 10 points  [de-identified] : Rash on face  [de-identified] : W

## 2018-09-28 NOTE — HISTORY OF PRESENT ILLNESS
[FreeTextEntry2] : 53F with postpolio syndrome (with right LE brace, wheelchair-bound) h/o congenital cleft palate s/p repair presents post-discharge. She presented to the ED with intermittent sharp midsternal chest pain that radiates to back and idry cough. She was worked up and ACS ruled out. CT chest unremarkable. Patient was also seen recently at [facility] about three weeks ago and discharged home with diagnosis of viral URI. \par \par He cough is chronic over the past year, nonproductive, worse at night, associated with occasional belching. Denies fever/chills, chest pain, nausea/vomiting, palpitations, lightheadedness. \par \par Per postpolio syndrome: pain involving R. hip and L. foot unchanged, controlled with biweekly PT ([facility]). Patient also takes ibuprofen PRN. She has HHA 4 days a week. \par \par Depression: Denies depressed mood, sleeps 5 hrs a night. No SI. She lives with her supportive daughter.\par \par Of note: Patient feels down in the month of September because she lost her son and brother in September.

## 2018-09-28 NOTE — ASSESSMENT
[FreeTextEntry1] : 53F with postpolio syndrome (with right LE brace, wheelchair-bound) h/o congenital cleft palate s/p repair presents post-discharge.\par \par # Chronic cough: Likely 2/2 GERD\par - Chronic cough worse at night with associated belching\par - Patient likes ice-cream, however she's allergic to ice-cream causing her to cough and SOB\par - Trial of PPI\par - Allergy referral given\par \par # Post-polio syndrome: functional paraplegia, wheelchair bound\par - cont PT twice weekly\par - ACU  to see patient \par \par # Irregular menstruation: likely due to menopause\par - OBGYN appointment scheduled\par \par # L. arch pain: s/p hammer toe surgery\par - follows with Podiatry monthly for management\par - cont NSAID PRN, moist compress, stretches\par - cont PT twice weekly; referral given\par \par # Depression: well controlled, PHQ-2 negative\par - Cont Cymbalta 30 qhs \par \par # HCM: \par - Td and PPneumovax given 01/2017\par - Mammo normal 2019; next due 2019\par - Pap: OBGYN appointment scheduled \par - Colonoscopy: GI referral given \par \par Case discussed with Dr. Dawkins. \par RTC in 2 months or earlier if need be

## 2018-09-28 NOTE — HISTORY OF PRESENT ILLNESS
[FreeTextEntry2] : 53F with postpolio syndrome (with right LE brace, wheelchair-bound) h/o congenital cleft palate s/p repair presents post-discharge. She presented to the ED with intermittent sharp midsternal chest pain that radiates to back and idry cough. She was worked up and ACS ruled out. CT chest unremarkable. Patient was also seen recently at [facility] about three weeks ago and discharged home with diagnosis of viral URI. \par \par He cough is chronic over the past year, nonproductive, worse at night, associated with occasional belching. Denies fever/chills, chest pain, nausea/vomiting, palpitations, lightheadedness. \par \par Per postpolio syndrome: pain involving R. hip and L. foot unchanged, controlled with biweekly PT ([facility]). Patient also takes ibuprofen PRN. She has HHA 4 days a week. \par \par Depression: Denies depressed mood, sleeps 5 hrs a night. No SI. She lives with her supportive daughter.\par \par Of note: Patient feels down in the month of September because she lost her son and brother in September.

## 2018-09-28 NOTE — PHYSICAL EXAM
[No Acute Distress] : no acute distress [EOMI] : extraocular movements intact [No JVD] : no jugular venous distention [No Respiratory Distress] : no respiratory distress  [Clear to Auscultation] : lungs were clear to auscultation bilaterally [Normal Rate] : normal rate  [Regular Rhythm] : with a regular rhythm [Normal S1, S2] : normal S1 and S2 [de-identified] : Wheelchair bound [de-identified] : Point tenderness on shoulders at > 10 points  [de-identified] : Rash on face  [de-identified] : W

## 2018-11-02 ENCOUNTER — APPOINTMENT (OUTPATIENT)
Dept: PEDIATRIC ALLERGY IMMUNOLOGY | Facility: CLINIC | Age: 53
End: 2018-11-02

## 2018-12-18 ENCOUNTER — APPOINTMENT (OUTPATIENT)
Dept: PULMONOLOGY | Facility: CLINIC | Age: 53
End: 2018-12-18

## 2018-12-27 ENCOUNTER — APPOINTMENT (OUTPATIENT)
Dept: INTERNAL MEDICINE | Facility: HOSPITAL | Age: 53
End: 2018-12-27

## 2019-01-08 ENCOUNTER — APPOINTMENT (OUTPATIENT)
Dept: PEDIATRIC ALLERGY IMMUNOLOGY | Facility: CLINIC | Age: 54
End: 2019-01-08

## 2019-01-08 ENCOUNTER — APPOINTMENT (OUTPATIENT)
Dept: OPHTHALMOLOGY | Facility: CLINIC | Age: 54
End: 2019-01-08

## 2019-01-29 ENCOUNTER — APPOINTMENT (OUTPATIENT)
Dept: INTERNAL MEDICINE | Facility: HOSPITAL | Age: 54
End: 2019-01-29

## 2019-02-11 ENCOUNTER — OUTPATIENT (OUTPATIENT)
Dept: OUTPATIENT SERVICES | Facility: HOSPITAL | Age: 54
LOS: 1 days | End: 2019-02-11
Payer: MEDICAID

## 2019-02-11 ENCOUNTER — LABORATORY RESULT (OUTPATIENT)
Age: 54
End: 2019-02-11

## 2019-02-11 ENCOUNTER — APPOINTMENT (OUTPATIENT)
Dept: PEDIATRIC ALLERGY IMMUNOLOGY | Facility: CLINIC | Age: 54
End: 2019-02-11
Payer: MEDICAID

## 2019-02-11 VITALS
HEART RATE: 96 BPM | BODY MASS INDEX: 19.13 KG/M2 | HEIGHT: 62.99 IN | WEIGHT: 107.98 LBS | SYSTOLIC BLOOD PRESSURE: 115 MMHG | DIASTOLIC BLOOD PRESSURE: 77 MMHG | OXYGEN SATURATION: 98 %

## 2019-02-11 DIAGNOSIS — Z91.018 ALLERGY TO OTHER FOODS: ICD-10-CM

## 2019-02-11 DIAGNOSIS — J30.89 OTHER ALLERGIC RHINITIS: ICD-10-CM

## 2019-02-11 DIAGNOSIS — Z98.89 OTHER SPECIFIED POSTPROCEDURAL STATES: Chronic | ICD-10-CM

## 2019-02-11 DIAGNOSIS — M35.9 SYSTEMIC INVOLVEMENT OF CONNECTIVE TISSUE, UNSPECIFIED: ICD-10-CM

## 2019-02-11 DIAGNOSIS — T78.1XXA OTHER ADVERSE FOOD REACTIONS, NOT ELSEWHERE CLASSIFIED, INITIAL ENCOUNTER: ICD-10-CM

## 2019-02-11 DIAGNOSIS — R21 RASH AND OTHER NONSPECIFIC SKIN ERUPTION: ICD-10-CM

## 2019-02-11 PROCEDURE — 99245 OFF/OP CONSLTJ NEW/EST HI 55: CPT

## 2019-02-11 PROCEDURE — G0463: CPT | Mod: 25

## 2019-02-11 PROCEDURE — 95004 PERQ TESTS W/ALRGNC XTRCS: CPT

## 2019-02-11 NOTE — IMPRESSION
[Allergy Testing Dust Mite] : dust mites [Allergy Testing Trees] : trees [Allergy Testing Grasses] : grasses [Allergy Testing Mixed Feathers] : feathers [Allergy Testing Cockroach] : cockroach [Allergy Testing Dog] : dog [Allergy Testing Cat] : cat [Allergy Testing Weeds] : weeds [] : peanut [________] : [unfilled]

## 2019-02-12 PROBLEM — Z91.018 ALLERGY TO TREE NUTS: Status: ACTIVE | Noted: 2019-02-12

## 2019-02-12 PROBLEM — R21 RASH: Status: ACTIVE | Noted: 2019-02-11

## 2019-02-12 PROBLEM — M35.9 PROGESTERONE DERMATITIS: Status: ACTIVE | Noted: 2019-02-12

## 2019-02-12 NOTE — CONSULT LETTER
[Dear  ___] : Dear  [unfilled], [Consult Letter:] : I had the pleasure of evaluating your patient, [unfilled]. [Please see my note below.] : Please see my note below. [Consult Closing:] : Thank you very much for allowing me to participate in the care of this patient.  If you have any questions, please do not hesitate to contact me. [Sincerely,] : Sincerely, [FreeTextEntry3] : Jennifer Ellison MD PhD\par Allergy Immunology Fellow\par [facility] \par \par MD CRIS AvalosI, YUNGI\par Adult and Pediatric Allergy, Asthma and Clinical Immunology\par  of Medicine and Pediatrics at\par   [facility]\par Section Head, Adult Allergy and Immunology\par   [facility]\par   Division of Allergy, Asthma and Immunology\par   [address] [place] [address], [place] [address]\par   [place], [place] [address]\par   Phone [phone]  Fax [phone]\par \par \par

## 2019-02-12 NOTE — PHYSICAL EXAM
[Alert] : alert [Well Nourished] : well nourished [Healthy Appearance] : healthy appearance [No Acute Distress] : no acute distress [Well Developed] : well developed [Normal Pupil & Iris Size/Symmetry] : normal pupil and iris size and symmetry [No Discharge] : no discharge [No Photophobia] : no photophobia [Sclera Not Icteric] : sclera not icteric [Normal TMs] : both tympanic membranes were normal [Normal Nasal Mucosa] : the nasal mucosa was normal [Normal Outer Ear/Nose] : the ears and nose were normal in appearance [Normal Tonsils] : normal tonsils [No Thrush] : no thrush [Normal Dentition] : normal dentition [No Neck Mass] : no neck mass was observed [Supple] : the neck was supple [Normal Rate and Effort] : normal respiratory rhythm and effort [Normal Palpation] : palpation of the chest revealed no abnormalities [No Crackles] : no crackles [No Retractions] : no retractions [Bilateral Audible Breath Sounds] : bilateral audible breath sounds [Normal Rate] : heart rate was normal  [Normal S1, S2] : normal S1 and S2 [No murmur] : no murmur [Regular Rhythm] : with a regular rhythm [Soft] : abdomen soft [Not Tender] : non-tender [Not Distended] : not distended [No HSM] : no hepato-splenomegaly [Normal Cervical Lymph Nodes] : cervical [Normal Axillary Lumph Nodes] : axillary [No Joint Swelling or Erythema] : no joint swelling or erythema [No clubbing] : no clubbing [No Edema] : no edema [No Cyanosis] : no cyanosis [Normal Mood] : mood was normal [Normal Affect] : affect was normal [Alert, Awake, Oriented as Age-Appropriate] : alert, awake, oriented as age appropriate [de-identified] : very nasal voice [de-identified] : visible cleft in palette [de-identified] : several facial bumps, including two erythematous, raised areas on forehead that appear open

## 2019-02-12 NOTE — END OF VISIT
[] : Fellow [FreeTextEntry3] : History is most consistent with autoimmune progesterone dermatitis. Will consider progesterone ST.\par Positive IgE to tree nuts may be false positive, will consider challenge.\par Cough, r/o aspiration to f/up with PCP/GI

## 2019-02-12 NOTE — REVIEW OF SYSTEMS
[Cough] : cough [Decrease In Appetite] : decreased appetite [Pruritis] : pruritis [Missed Last Period] : missed last period [Nl] : Psychiatric [Nosebleeds] : no epistaxis [Rhinorrhea] : no rhinorrhea [Nasal Dryness] : no dryness of the nose [Nasal Congestion] : no nasal congestion [Snoring] : no snoring [Nasal Itching] : no nasal itching [Mouth Sores] : no mouth sores [Bad Breath] : no bad breath [Oral Thrush] : no oral thrush [Sore Throat] : no sore throat [Hoarseness] : no hoarseness [Throat Itching] : no throat itching [Post Nasal Drip] : no post nasal drip [Sneezing] : no sneezing [Difficulty Breathing] : no dyspnea [SOB at Rest] : no shortness of breath at rest [SOB with Exertion] : no dyspnea on exertion [Nocturnal Awakening] : no nocturnal awakening with shortness of breath [Sputum Production] : not coughing up sputum [Congested In The Chest] : not feeling ~L congested in the chest [Wheezing Worsens With Exercise] : wheezing does not worsen with exercise [Wheezing Worse During Cold Weather] : wheezing not ~L worse during cold weather [Wheezing] : no wheezing [Pain On Swallowing] : no pain on swallowing [Difficulty Swallowing] : no dysphagia [Heartburn] : no heartburn [Nausea] : no nausea [Vomiting] : no vomiting [Diarrhea] : no diarrhea [Abdominal Pain] : no abdominal pain [Urticaria] : no urticaria [Atopic Dermatitis] : no atopic dermatitis [Dry Skin] : no ~L dry skin [Swelling] : no swelling [Jitteriness] : no jitteriness [Heat/Cold Intolerance] : no temperature intolerance [Dec Urine Output] : no oliguria [Burning Sensation] : no burning sensation [Vaginal Discharge] : no vaginal discharge [Dysmenorrhea] : no dysmenorrhea [Pregnant] : not pregnant [Pain In Flank] : no pain in the flank [FreeTextEntry4] : cleft palette  [de-identified] : facial bumps [FreeTextEntry1] : in menopause

## 2019-02-12 NOTE — HISTORY OF PRESENT ILLNESS
[de-identified] : Mrs. Cody is a 53 year old woman with an unrepaired cleft palette and bulbar weakness s/p polio coming in for allergy evaluation. She expresses concern over facial bumps that she feels are related to nuts, ensure and her menstrual cycle.\par \par Nuts: She has eaten mixed nuts for many years however last year she noticed after eating the mixed nuts the next day she would have some pimples and bumps. These issues were always a day after eating the nuts, there were never any immediate symptoms. While they were itchy they were not hive like and they generally got worse after she picked at them. She stopped eating them for one year. She avoids all nuts and peanuts.\par \par Ensure: She is a picky eater and started Ensure one year ago. She noticed she gets bumps on the face roughly one day after the ensure. Again the bumps are itchy but look more like pimples and worsen after she picks them.\par \par Menstrual cycle: when she gets her menstrual cycle she gets large facial bumps on week before. They are very itchy and cosmetically disfiguring. Once they fade they leave a dark john. She says she stopped having her cycle for five months then had one randomly.  When she did not get her cycles she did not get the facial bumps. She is currently in menopause.\par \par Cough: she also notes that when she eats she gets a lot of coughing. \par \par cleft: she has been offered repair in the past and declined.

## 2019-02-14 LAB
ALMOND IGE QN: 1.33 KUA/L
BRAZIL NUT IGE QN: 3.86 KUA/L
CASHEW NUT IGE QN: 0.52 KUA/L
DEPRECATED ALMOND IGE RAST QL: ABNORMAL
DEPRECATED BRAZIL NUT IGE RAST QL: ABNORMAL
DEPRECATED CASHEW NUT IGE RAST QL: ABNORMAL
DEPRECATED HAZELNUT IGE RAST QL: ABNORMAL
DEPRECATED PEANUT IGE RAST QL: ABNORMAL
DEPRECATED PECAN/HICK TREE IGE RAST QL: ABNORMAL
DEPRECATED PISTACHIO IGE RAST QL: 1.2 KUA/L
DEPRECATED WALNUT IGE RAST QL: ABNORMAL
HAZELNUT IGE QN: 1.02 KUA/L
PEANUT IGE QN: 0.65 KUA/L
PECAN/HICK TREE IGE QN: 0.4 KUA/L
PISTACHIO IGE QN: ABNORMAL
R COR A1 PR-10 HAZELNUT (F428) CLASS: 0 (ref 0–?)
R COR A1 PR-10 HAZELNUT (F428) CONC: <0.1 KUA/L
R COR A14 HAZELNUT (F439) CLASS: 0 (ref 0–?)
R COR A14 HAZELNUT (F439) CONC: <0.1 KUA/L
R COR A8 LTP HAZELNUT (F425) CLASS: 0 (ref 0–?)
R COR A8 LTP HAZELNUT (F425) CONC: <0.1 KUA/L
R COR A9 HAZELNUT (F440) CLASS: ABNORMAL (ref 0–?)
R COR A9 HAZELNUT (F440) CONC: 1.45 KUA/L
R JUG R1 STORAGE PROTEIN WALNUT (F441) CLASS: 0 (ref 0–?)
R JUG R1 STORAGE PROTEIN WALNUT (F441) CONC: <0.1 KUA/L
R JUG R3 LPT WALNUT (F442) CLASS: 0 (ref 0–?)
R JUG R3 LPT WALNUT (F442) CONC: <0.1 KUA/L
WALNUT IGE QN: 0.61 KUA/L

## 2019-02-15 DIAGNOSIS — Z91.018 ALLERGY TO OTHER FOODS: ICD-10-CM

## 2019-02-15 DIAGNOSIS — J30.1 ALLERGIC RHINITIS DUE TO POLLEN: ICD-10-CM

## 2019-02-15 DIAGNOSIS — T78.1XXA OTHER ADVERSE FOOD REACTIONS, NOT ELSEWHERE CLASSIFIED, INITIAL ENCOUNTER: ICD-10-CM

## 2019-02-15 DIAGNOSIS — M35.9 SYSTEMIC INVOLVEMENT OF CONNECTIVE TISSUE, UNSPECIFIED: ICD-10-CM

## 2019-02-15 DIAGNOSIS — R05 COUGH: ICD-10-CM

## 2019-02-15 DIAGNOSIS — J30.89 OTHER ALLERGIC RHINITIS: ICD-10-CM

## 2019-04-02 ENCOUNTER — RX RENEWAL (OUTPATIENT)
Age: 54
End: 2019-04-02

## 2019-05-17 ENCOUNTER — APPOINTMENT (OUTPATIENT)
Dept: INTERNAL MEDICINE | Facility: CLINIC | Age: 54
End: 2019-05-17

## 2019-06-06 ENCOUNTER — APPOINTMENT (OUTPATIENT)
Dept: OPHTHALMOLOGY | Facility: CLINIC | Age: 54
End: 2019-06-06

## 2019-06-12 ENCOUNTER — RX RENEWAL (OUTPATIENT)
Age: 54
End: 2019-06-12

## 2019-06-21 ENCOUNTER — APPOINTMENT (OUTPATIENT)
Dept: INTERNAL MEDICINE | Facility: CLINIC | Age: 54
End: 2019-06-21
Payer: MEDICAID

## 2019-06-21 ENCOUNTER — OUTPATIENT (OUTPATIENT)
Dept: OUTPATIENT SERVICES | Facility: HOSPITAL | Age: 54
LOS: 1 days | End: 2019-06-21

## 2019-06-21 ENCOUNTER — LABORATORY RESULT (OUTPATIENT)
Age: 54
End: 2019-06-21

## 2019-06-21 VITALS
DIASTOLIC BLOOD PRESSURE: 64 MMHG | WEIGHT: 96 LBS | OXYGEN SATURATION: 100 % | SYSTOLIC BLOOD PRESSURE: 92 MMHG | HEART RATE: 95 BPM | HEIGHT: 62.99 IN | BODY MASS INDEX: 17.01 KG/M2

## 2019-06-21 DIAGNOSIS — M62.838 OTHER MUSCLE SPASM: ICD-10-CM

## 2019-06-21 DIAGNOSIS — Z98.89 OTHER SPECIFIED POSTPROCEDURAL STATES: Chronic | ICD-10-CM

## 2019-06-21 DIAGNOSIS — Z87.898 PERSONAL HISTORY OF OTHER SPECIFIED CONDITIONS: ICD-10-CM

## 2019-06-21 DIAGNOSIS — Z87.19 PERSONAL HISTORY OF OTHER DISEASES OF THE DIGESTIVE SYSTEM: ICD-10-CM

## 2019-06-21 LAB
ANION GAP SERPL CALC-SCNC: 12 MMO/L — SIGNIFICANT CHANGE UP (ref 7–14)
BASOPHILS # BLD AUTO: 0.04 K/UL — SIGNIFICANT CHANGE UP (ref 0–0.2)
BASOPHILS NFR BLD AUTO: 0.5 % — SIGNIFICANT CHANGE UP (ref 0–2)
BUN SERPL-MCNC: 12 MG/DL — SIGNIFICANT CHANGE UP (ref 7–23)
CALCIUM SERPL-MCNC: 9.9 MG/DL — SIGNIFICANT CHANGE UP (ref 8.4–10.5)
CHLORIDE SERPL-SCNC: 104 MMOL/L — SIGNIFICANT CHANGE UP (ref 98–107)
CO2 SERPL-SCNC: 25 MMOL/L — SIGNIFICANT CHANGE UP (ref 22–31)
CREAT SERPL-MCNC: 0.48 MG/DL — LOW (ref 0.5–1.3)
EOSINOPHIL # BLD AUTO: 0.24 K/UL — SIGNIFICANT CHANGE UP (ref 0–0.5)
EOSINOPHIL NFR BLD AUTO: 3 % — SIGNIFICANT CHANGE UP (ref 0–6)
GLUCOSE SERPL-MCNC: 85 MG/DL — SIGNIFICANT CHANGE UP (ref 70–99)
HBA1C BLD-MCNC: 5.7 % — HIGH (ref 4–5.6)
HCT VFR BLD CALC: 39.7 % — SIGNIFICANT CHANGE UP (ref 34.5–45)
HGB BLD-MCNC: 12.4 G/DL — SIGNIFICANT CHANGE UP (ref 11.5–15.5)
IMM GRANULOCYTES NFR BLD AUTO: 0.1 % — SIGNIFICANT CHANGE UP (ref 0–1.5)
LYMPHOCYTES # BLD AUTO: 2.65 K/UL — SIGNIFICANT CHANGE UP (ref 1–3.3)
LYMPHOCYTES # BLD AUTO: 33.3 % — SIGNIFICANT CHANGE UP (ref 13–44)
MCHC RBC-ENTMCNC: 28.1 PG — SIGNIFICANT CHANGE UP (ref 27–34)
MCHC RBC-ENTMCNC: 31.2 % — LOW (ref 32–36)
MCV RBC AUTO: 90 FL — SIGNIFICANT CHANGE UP (ref 80–100)
MONOCYTES # BLD AUTO: 0.47 K/UL — SIGNIFICANT CHANGE UP (ref 0–0.9)
MONOCYTES NFR BLD AUTO: 5.9 % — SIGNIFICANT CHANGE UP (ref 2–14)
NEUTROPHILS # BLD AUTO: 4.55 K/UL — SIGNIFICANT CHANGE UP (ref 1.8–7.4)
NEUTROPHILS NFR BLD AUTO: 57.2 % — SIGNIFICANT CHANGE UP (ref 43–77)
NRBC # FLD: 0 K/UL — SIGNIFICANT CHANGE UP (ref 0–0)
PLATELET # BLD AUTO: 279 K/UL — SIGNIFICANT CHANGE UP (ref 150–400)
PMV BLD: 12.1 FL — SIGNIFICANT CHANGE UP (ref 7–13)
POTASSIUM SERPL-MCNC: 4.3 MMOL/L — SIGNIFICANT CHANGE UP (ref 3.5–5.3)
POTASSIUM SERPL-SCNC: 4.3 MMOL/L — SIGNIFICANT CHANGE UP (ref 3.5–5.3)
RBC # BLD: 4.41 M/UL — SIGNIFICANT CHANGE UP (ref 3.8–5.2)
RBC # FLD: 12.7 % — SIGNIFICANT CHANGE UP (ref 10.3–14.5)
SODIUM SERPL-SCNC: 141 MMOL/L — SIGNIFICANT CHANGE UP (ref 135–145)
WBC # BLD: 7.96 K/UL — SIGNIFICANT CHANGE UP (ref 3.8–10.5)
WBC # FLD AUTO: 7.96 K/UL — SIGNIFICANT CHANGE UP (ref 3.8–10.5)

## 2019-06-21 PROCEDURE — 99213 OFFICE O/P EST LOW 20 MIN: CPT | Mod: GE

## 2019-06-21 RX ORDER — IBUPROFEN 200 MG
600 CAPSULE ORAL DAILY
Qty: 30 | Refills: 5 | Status: DISCONTINUED | COMMUNITY
Start: 2018-08-10 | End: 2019-06-21

## 2019-06-21 RX ORDER — TIOTROPIUM BROMIDE INHALATION SPRAY 1.56 UG/1
1.25 SPRAY, METERED RESPIRATORY (INHALATION) DAILY
Qty: 1 | Refills: 1 | Status: DISCONTINUED | COMMUNITY
Start: 2018-08-09 | End: 2019-06-21

## 2019-06-21 RX ORDER — PANTOPRAZOLE 40 MG/1
40 TABLET, DELAYED RELEASE ORAL DAILY
Qty: 30 | Refills: 3 | Status: COMPLETED | COMMUNITY
Start: 2018-09-27 | End: 2019-06-21

## 2019-06-24 DIAGNOSIS — R06.09 OTHER FORMS OF DYSPNEA: ICD-10-CM

## 2019-06-24 DIAGNOSIS — G14 POSTPOLIO SYNDROME: ICD-10-CM

## 2019-06-24 DIAGNOSIS — R05 COUGH: ICD-10-CM

## 2019-06-24 DIAGNOSIS — M62.838 OTHER MUSCLE SPASM: ICD-10-CM

## 2019-06-24 NOTE — HISTORY OF PRESENT ILLNESS
[de-identified] : 54 year old woman with unrepaired congenital cleft palate (bulbar weakness), left pes equinus and post-polio syndrome (right LE brace, functional paraplegia, wheelchair bound) who presents for follow up of chronic conditions. \par \par Patient complains of lower right-sided back pain with radiation to pelvis for 3 weeks, acute onset, intermittent frequency, worsens with prolonged sitting and when sleeping. Patient notes pain is greatest upon awakening in the morning. No association of pain with dysuria, frequent urination or hematuria. Patient has left-sided sciatica but denies shooting pains down the right leg. Pain is temporarily relieved with 200 mg x 2 tablets ibuprofen. \par \par Patient has had interval visits to Allergy/Imm for evaluation. Testing revealed multiple allergies most significantly to multiple nuts EXCEPT to peanuts and pecans. Patient raised concerns at that visit regarding cough with meals which was further explored today. She admits to dry cough while eating, talking and with exposure to cold air. She was prescribed trial of PPI this year for possible cough related to acid reflux which did not help. She recently prescribed trial of intranasal steroid for possible post-nasal drip and reports improvement in rhinorrhea but not cough. \par \par Patient states shortness of breath with ambulation is unchanged. She uses albuterol inhaler 2-3 times per day and reports relief of symptoms but she finished her last inhaler approx. 4 months ago. She has no sputum production, no prior smoking history or no second-hand smoke exposure. Patient has imaging on file including CT chest in 09/2018 (seen in [place]) which revealed branching tubular opacity in right upper lobe near the fissure likely mucoid impaction. Patient reports no respiratory involvement during acute polio illness as a child. No childhood asthma, prior PFTs or Pulmonary evaluation. \par \par Patient requesting PT referral. She has been previously enrolled in [facility] for lumbar radiculopathy and post-polio syndrome.

## 2019-06-24 NOTE — PHYSICAL EXAM
[No Acute Distress] : no acute distress [Well Nourished] : well nourished [Normal Sclera/Conjunctiva] : normal sclera/conjunctiva [PERRL] : pupils equal round and reactive to light [EOMI] : extraocular movements intact [Normal Oropharynx] : the oropharynx was normal [No JVD] : no jugular venous distention [Supple] : supple [Thyroid Normal, No Nodules] : the thyroid was normal and there were no nodules present [No Respiratory Distress] : no respiratory distress  [Clear to Auscultation] : lungs were clear to auscultation bilaterally [No Accessory Muscle Use] : no accessory muscle use [Normal Rate] : normal rate  [Regular Rhythm] : with a regular rhythm [Normal S1, S2] : normal S1 and S2 [No Extremity Clubbing/Cyanosis] : no extremity clubbing/cyanosis [Soft] : abdomen soft [Non-distended] : non-distended [Normal Bowel Sounds] : normal bowel sounds [Normal Posterior Cervical Nodes] : no posterior cervical lymphadenopathy [Normal Anterior Cervical Nodes] : no anterior cervical lymphadenopathy [No CVA Tenderness] : no CVA  tenderness [Acne] : acne [Normal Insight/Judgement] : insight and judgment were intact [de-identified] : Wheel-chair bound with right leg orthotic brace [de-identified] : Dysphonia; unrepaired cleft palate  [de-identified] : Breath sounds diminished diffusely; no wheeze [de-identified] : Deferred [de-identified] : Lower thoracic or upper lumbar spinous processes tender to palpation   [de-identified] : Large PIP joints; large MCP joints in 1st digit bilaterally; [de-identified] : Malar hyperpigmentation   [de-identified] : Unable to assess gait; RLE- decreased muscle tone, strength 2/5; LLE - strength 5/5 but limited by pain; B/L UE - strength 5/5

## 2019-06-24 NOTE — ASSESSMENT
[FreeTextEntry1] : 54 year old woman with unrepaired congenital cleft palate (bulbar weakness), left pes equinus and post-polio syndrome (right LE brace, functional paraplegia, wheelchair bound) who presents for new left-sided buttock pain with radiation to ipsilateral groin, and bilateral arm pain, with possible component of muscle spasticity in setting of post-polio syndrome. Routine laboratory testing performed today. Healthcare maintenance/malignancy screening to be address at follow up visit in 5 weeks. \par \par # Muscle spasticity \par - involving lumbar back/lower extremity and bilateral upper extremities\par - will start on trial of cyclobenzaprine 10 mg qhs PRN (14 tablets given)\par - reassess symptom response in 5 weeks at follow up visit \par \par # Cough: reactive airway disease vs. chronic microaspiration in setting of unrepaired cleft palate; unimproved with trial of PPI or intranasal steroid\par - referral for speech & swallow evaluation\par - referral to Pulmonology \par \par # Dyspnea: restrictive lung disease vs. reactive airway disease vs. microaspiration\par - referral to Pulmonology for PFTs and further evaluation\par - stop Spiriva today given low suspicion for obstructive disease \par - cont albuterol PRN \par \par # Post-polio syndrome: stable\par - functional paraplegia, wheelchair bound\par - PT referral given\par \par # Depression: \par - on Cymbalta 30 qhs \par - please perform PHQ-2 at next visit \par \par # HCM: \par - Td and PPSV23 given 01/2017\par - PAP due now; discuss at next visit \par - Mammo normal 2017; given referral at next visit\par - Colon CA screen due now; discuss FIT at next visit\par \par D/w Dr. Basurto \par RTC 5 weeks to follow up on symptom response to cyclobenzaprine, speech & swallow evaluation and address health care maintenance.  \par \par CARLOTA Irby MD\par [employer] [facility]

## 2019-06-24 NOTE — REVIEW OF SYSTEMS
[Cough] : cough [Shortness Of Breath] : shortness of breath [Back Pain] : back pain [Recent Change In Weight] : ~T recent weight change [Muscle Weakness] : muscle weakness [Fever] : no fever [Chills] : no chills [Vision Problems] : no vision problems [Sore Throat] : no sore throat [Postnasal Drip] : no postnasal drip [Chest Pain] : no chest pain [Palpitations] : no palpitations [Lower Ext Edema] : no lower extremity edema [Dyspnea on Exertion] : no dyspnea on exertion [Dysuria] : no dysuria [Hematuria] : no hematuria [Frequency] : no frequency [Skin Rash] : no skin rash [Headache] : no headache [Dizziness] : no dizziness [Anxiety] : no anxiety [Depression] : no depression [FreeTextEntry4] : Nasal discharge - improved [FreeTextEntry9] : Bilateral arm stiffness and pain, intermittent

## 2019-07-26 ENCOUNTER — APPOINTMENT (OUTPATIENT)
Dept: INTERNAL MEDICINE | Facility: CLINIC | Age: 54
End: 2019-07-26

## 2019-09-11 ENCOUNTER — APPOINTMENT (OUTPATIENT)
Dept: PULMONOLOGY | Facility: CLINIC | Age: 54
End: 2019-09-11

## 2019-10-02 ENCOUNTER — APPOINTMENT (OUTPATIENT)
Dept: PULMONOLOGY | Facility: CLINIC | Age: 54
End: 2019-10-02
Payer: MEDICAID

## 2019-10-02 VITALS
RESPIRATION RATE: 16 BRPM | WEIGHT: 109 LBS | HEART RATE: 80 BPM | TEMPERATURE: 97.4 F | SYSTOLIC BLOOD PRESSURE: 105 MMHG | BODY MASS INDEX: 19.31 KG/M2 | DIASTOLIC BLOOD PRESSURE: 65 MMHG | HEIGHT: 62.99 IN

## 2019-10-02 DIAGNOSIS — R06.09 OTHER FORMS OF DYSPNEA: ICD-10-CM

## 2019-10-02 PROCEDURE — 99203 OFFICE O/P NEW LOW 30 MIN: CPT

## 2019-10-02 NOTE — ASSESSMENT
[FreeTextEntry1] : 54F from [facility] with allergic rhinitis, unrepaired cleft palate, and postpolio syndrome with lower extremity flaccid paresis and severely impaired gait referred for chronic cough and dyspnea on exertion which is reportedly improved with albuterol. Her symptoms may be due to reactive airway disease given improvement with bronchodilators. However, she could also have restrictive lung disease associated with her polio as well as possible recurrent aspiration given her cleft palate and CT 9/2018 with possible mucoid impaction. \par \par - PFTs prior to follow up visit in 1 week\par - Modified barium esophagram\par - Can continue albuterol PRN for now\par \par Discussed with Dr. Augustine

## 2019-10-02 NOTE — PHYSICAL EXAM
[General Appearance - In No Acute Distress] : no acute distress [General Appearance - Well Nourished] : well nourished [Neck Appearance] : the appearance of the neck was normal [Jugular Venous Distention Increased] : there was no jugular-venous distention [Thyroid Diffuse Enlargement] : the thyroid was not enlarged [Heart Rate And Rhythm] : heart rate and rhythm were normal [Heart Sounds] : normal S1 and S2 [Murmurs] : no murmurs present [] : no respiratory distress [Edema] : no peripheral edema present [Auscultation Breath Sounds / Voice Sounds] : lungs were clear to auscultation bilaterally [Respiration, Rhythm And Depth] : normal respiratory rhythm and effort [Bowel Sounds] : normal bowel sounds [Abdomen Soft] : soft [Abdomen Tenderness] : non-tender [Cyanosis, Localized] : no localized cyanosis [Nail Clubbing] : no clubbing of the fingernails [Mood] : the mood was normal [Affect] : the affect was normal [Oriented To Time, Place, And Person] : oriented to person, place, and time [FreeTextEntry2] : no pedal edema [FreeTextEntry1] : hyporeflexia and decreased tone in lower extremities

## 2019-10-02 NOTE — HISTORY OF PRESENT ILLNESS
[FreeTextEntry1] : 54F from [facility] with allergic rhinitis, unrepaired cleft palate, and postpolio syndrome with lower extremity flaccid paresis and severely impaired gait referred for chronic cough and dyspnea on exertion. Pt walks with great difficulty and reports chronic dyspnea on mild exertion which has been present her whole life. She also has chronic cough productive of white sputum and difficulty swallowing, which she says is improved when she eats slowly. Pt says her shortness of breath is improved by albuterol, which she takes 2-3 times daily. No prior history of asthma and pt never smoked tobacco. She says she was hospitalized for pneumonia 1 year ago at an OSH. CT chest performed 9/2018 for persistent cough showed a tiny RUL branching opacity, likely mucoid impaction. Becomes tachycardic to 110 on mild exertion, no hypoxemia with ambulation.

## 2019-10-02 NOTE — REVIEW OF SYSTEMS
[Fatigue] : fatigue [Nasal Congestion] : nasal congestion [As Noted in HPI] : as noted in HPI [Postnasal Drip] : postnasal drip [Cough] : cough [Sputum] : sputum  [Dyspnea] : dyspnea [Chest Tightness] : chest tightness [Chest Discomfort] : chest discomfort [Back Pain] : ~T back pain [Arthralgias] : arthralgias [Focal Weakness] : focal weakness [Paralysis] : paralysis was seen [Negative] : Psychiatric [Fever] : no fever [Chills] : no chills [Poor Appetite] : normal appetite  [Recent Wt Gain (___ Lbs)] : no recent weight gain [Recent Wt Loss (___ Lbs)] : no recent weight loss [Hemoptysis] : no hemoptysis [Pleuritic Pain] : no pleuritic pain [Frequent URIs] : no frequent upper respiratory infections [Wheezing] : no wheezing [PND] : no PND [Orthopnea] : no orthopnea [Headache] : no headache [FreeTextEntry2] : c

## 2019-10-03 ENCOUNTER — LABORATORY RESULT (OUTPATIENT)
Age: 54
End: 2019-10-03

## 2019-10-03 ENCOUNTER — APPOINTMENT (OUTPATIENT)
Dept: INTERNAL MEDICINE | Facility: CLINIC | Age: 54
End: 2019-10-03
Payer: MEDICAID

## 2019-10-03 ENCOUNTER — OUTPATIENT (OUTPATIENT)
Dept: OUTPATIENT SERVICES | Facility: HOSPITAL | Age: 54
LOS: 1 days | End: 2019-10-03

## 2019-10-03 VITALS — HEART RATE: 72 BPM | WEIGHT: 108 LBS | HEIGHT: 62 IN | BODY MASS INDEX: 19.88 KG/M2 | OXYGEN SATURATION: 99 %

## 2019-10-03 VITALS — SYSTOLIC BLOOD PRESSURE: 98 MMHG | DIASTOLIC BLOOD PRESSURE: 62 MMHG

## 2019-10-03 DIAGNOSIS — Z98.89 OTHER SPECIFIED POSTPROCEDURAL STATES: Chronic | ICD-10-CM

## 2019-10-03 LAB
CHOLEST SERPL-MCNC: 186 MG/DL — SIGNIFICANT CHANGE UP (ref 120–199)
HDLC SERPL-MCNC: 56 MG/DL — SIGNIFICANT CHANGE UP (ref 45–65)
LIPID PNL WITH DIRECT LDL SERPL: 111 MG/DL — SIGNIFICANT CHANGE UP
TRIGL SERPL-MCNC: 219 MG/DL — HIGH (ref 10–149)

## 2019-10-03 PROCEDURE — 99214 OFFICE O/P EST MOD 30 MIN: CPT | Mod: GC

## 2019-10-03 RX ORDER — ALBUTEROL SULFATE 90 UG/1
108 (90 BASE) AEROSOL, METERED RESPIRATORY (INHALATION)
Qty: 1 | Refills: 5 | Status: DISCONTINUED | COMMUNITY
Start: 2017-07-21 | End: 2019-10-03

## 2019-10-03 RX ORDER — CYCLOBENZAPRINE HYDROCHLORIDE 10 MG/1
10 TABLET, FILM COATED ORAL
Qty: 14 | Refills: 0 | Status: DISCONTINUED | COMMUNITY
Start: 2019-06-21 | End: 2019-10-03

## 2019-10-04 ENCOUNTER — APPOINTMENT (OUTPATIENT)
Dept: OPHTHALMOLOGY | Facility: CLINIC | Age: 54
End: 2019-10-04

## 2019-10-04 DIAGNOSIS — G14 POSTPOLIO SYNDROME: ICD-10-CM

## 2019-10-04 DIAGNOSIS — R06.02 SHORTNESS OF BREATH: ICD-10-CM

## 2019-10-04 DIAGNOSIS — M25.521 PAIN IN RIGHT ELBOW: ICD-10-CM

## 2019-10-04 DIAGNOSIS — Z23 ENCOUNTER FOR IMMUNIZATION: ICD-10-CM

## 2019-10-04 DIAGNOSIS — F32.9 MAJOR DEPRESSIVE DISORDER, SINGLE EPISODE, UNSPECIFIED: ICD-10-CM

## 2019-10-04 DIAGNOSIS — R05 COUGH: ICD-10-CM

## 2019-10-04 NOTE — HISTORY OF PRESENT ILLNESS
[FreeTextEntry1] : "I have pain and stiffness in my elbow and arm muscles" [de-identified] : 54 year old woman with unrepaired congenital cleft palate (bulbar weakness), left pes equinus and post-polio syndrome (right LE brace, functional paraplegia, wheelchair/walker use) who presents for follow up of chronic conditions. \par \par Patient states she has continued to have back and upper extremity pain that is worse when she wakes up in the morning. Patient points to her elbows, wrists, forearms, shoulders and says she has to massage them sometimes during the night. States that she sometimes takes two pills of Advil that helps, Tylenol does not help as much. Denies muscle weakness, joint swelling, fevers, chills. Endorses headache that comes for one day every couple of weeks and is relieved somewhat by Ibuprofen as well. Denies vision changes, dizziness, falls. \par \par Patient states her shortness of breath with ambulation has remained unchanged. Uses Albuterol rescue inhaler 2-3 times per day. Otherwise takes her Spiriva as scheduled. CT chest in 09/2018 showing likely mucoid impaction RUL. Endorses nighttime coughing, wakes her up once per night. Endorses some heartburn that has improved since starting PPI at last visit. Patient established care with Pulmonology earlier this week, previous thinking for etiology of dyspnea involves microaspirations (cleft palate) vs restrictive airway disease (post-polio syndrome). Plan for PFT's and Barium Esophagram within the next week. \par \par Patient has home aid four days a week, lives with either son or daughter who provide significant help with things. Patient able to cook for herself and perform many basic ADL's without assistance. Patient requesting another PT referral, provides # she needs it faxed to. [phone].

## 2019-10-04 NOTE — REVIEW OF SYSTEMS
[Shortness Of Breath] : shortness of breath [Cough] : cough [Joint Pain] : joint pain [Joint Stiffness] : joint stiffness [Muscle Pain] : muscle pain [Back Pain] : back pain [Headache] : headache [Fever] : no fever [Chills] : no chills [Fatigue] : no fatigue [Discharge] : no discharge [Pain] : no pain [Hearing Loss] : no hearing loss [Earache] : no earache [Chest Pain] : no chest pain [Palpitations] : no palpitations [Orthopnea] : no orthopnea [Wheezing] : no wheezing [Nausea] : no nausea [Abdominal Pain] : no abdominal pain [Dysuria] : no dysuria [Vomiting] : no vomiting [Incontinence] : no incontinence [Frequency] : no frequency [Hematuria] : no hematuria [Joint Swelling] : no joint swelling [Dizziness] : no dizziness [Muscle Weakness] : no muscle weakness [Easy Bleeding] : no easy bleeding [Fainting] : no fainting [Easy Bruising] : no easy bruising

## 2019-10-04 NOTE — ASSESSMENT
[FreeTextEntry1] : 54 year old woman with unrepaired congenital cleft palate (bulbar weakness), left pes equinus and post-polio syndrome (right LE brace, functional paraplegia, wheelchair bound) who presents for follow-up visit for chronic problems. \par \par # Joint pains: worse in the morning, stiffness\par - Involving upper extremity joints, no MIP involvement\par - Will defer RA lab w/u at this time, low suspicion for RA with relatively nml physical exam and co-existing muscle pain sxs--less likely inflammatory joint pains\par - reassess symptom response in 5 weeks at follow up visit following starting PT \par \par # Cough: reactive airway disease vs. chronic microaspiration in setting of unrepaired cleft palate; \par - Improving, patient to have Barium swallow this week\par \par # Dyspnea: restrictive lung disease vs. reactive airway disease vs. microaspiration\par - saw Pulm yesterday, to have PFT's and Barium Swallow within the next week \par - cont albuterol PRN per Pulmonology\par \par # Post-polio syndrome: stable\par - functional paraplegia, wheelchair bound\par - PT referral given again\par \par # Depression: \par - on Cymbalta 30 qhs \par - PHQ 2 score of 0 today\par \par # HCM: \par - Td and PPSV23 given 01/2017\par - Influenza given today\par - PAP referral given\par - Mammo normal 2017; given referral at next visit\par - Colon CA screen due now; colonoscopy referral to GI given\par \par Patient seen and discussed with Dr. Dawkins\par \par RTC in five weeks for assessment of Dyspnea and joint pains\par \par French Arora, PGY-1

## 2019-10-04 NOTE — PHYSICAL EXAM
[No Acute Distress] : no acute distress [Well Developed] : well developed [Normal Sclera/Conjunctiva] : normal sclera/conjunctiva [PERRL] : pupils equal round and reactive to light [Normal Outer Ear/Nose] : the outer ears and nose were normal in appearance [No Lymphadenopathy] : no lymphadenopathy [Normal Oropharynx] : the oropharynx was normal [Supple] : supple [No Respiratory Distress] : no respiratory distress  [No Accessory Muscle Use] : no accessory muscle use [Normal Rate] : normal rate  [Regular Rhythm] : with a regular rhythm [Pedal Pulses Present] : the pedal pulses are present [No Edema] : there was no peripheral edema [Soft] : abdomen soft [Non Tender] : non-tender [Non-distended] : non-distended [Normal Bowel Sounds] : normal bowel sounds [No CVA Tenderness] : no CVA  tenderness [No Spinal Tenderness] : no spinal tenderness [No Joint Swelling] : no joint swelling [Coordination Grossly Intact] : coordination grossly intact [Normal Affect] : the affect was normal [Normal Insight/Judgement] : insight and judgment were intact [de-identified] : Reduced air movement bilaterally, no focal crackles or any wheezing [de-identified] : 2/5 strength bilateral LE. UE pain with abduction above 90 degrees. 5/5 strength bilateral UE to flexion and extension

## 2019-10-08 ENCOUNTER — FORM ENCOUNTER (OUTPATIENT)
Age: 54
End: 2019-10-08

## 2019-10-09 ENCOUNTER — APPOINTMENT (OUTPATIENT)
Dept: SPEECH THERAPY | Facility: HOSPITAL | Age: 54
End: 2019-10-09
Payer: MEDICAID

## 2019-10-09 ENCOUNTER — APPOINTMENT (OUTPATIENT)
Dept: PULMONOLOGY | Facility: CLINIC | Age: 54
End: 2019-10-09
Payer: MEDICAID

## 2019-10-09 ENCOUNTER — OUTPATIENT (OUTPATIENT)
Dept: OUTPATIENT SERVICES | Facility: HOSPITAL | Age: 54
LOS: 1 days | Discharge: HOME | End: 2019-10-09

## 2019-10-09 ENCOUNTER — OUTPATIENT (OUTPATIENT)
Dept: OUTPATIENT SERVICES | Facility: HOSPITAL | Age: 54
LOS: 1 days | End: 2019-10-09

## 2019-10-09 ENCOUNTER — APPOINTMENT (OUTPATIENT)
Dept: RADIOLOGY | Facility: HOSPITAL | Age: 54
End: 2019-10-09
Payer: MEDICAID

## 2019-10-09 VITALS
SYSTOLIC BLOOD PRESSURE: 103 MMHG | HEIGHT: 62 IN | WEIGHT: 108 LBS | HEART RATE: 96 BPM | RESPIRATION RATE: 16 BRPM | DIASTOLIC BLOOD PRESSURE: 70 MMHG | BODY MASS INDEX: 19.88 KG/M2 | TEMPERATURE: 99 F

## 2019-10-09 DIAGNOSIS — Z87.09 PERSONAL HISTORY OF OTHER DISEASES OF THE RESPIRATORY SYSTEM: ICD-10-CM

## 2019-10-09 DIAGNOSIS — R05 COUGH: ICD-10-CM

## 2019-10-09 DIAGNOSIS — Z98.89 OTHER SPECIFIED POSTPROCEDURAL STATES: Chronic | ICD-10-CM

## 2019-10-09 DIAGNOSIS — R13.10 DYSPHAGIA, UNSPECIFIED: ICD-10-CM

## 2019-10-09 PROCEDURE — 99214 OFFICE O/P EST MOD 30 MIN: CPT | Mod: 25

## 2019-10-09 PROCEDURE — 94726 PLETHYSMOGRAPHY LUNG VOLUMES: CPT

## 2019-10-09 PROCEDURE — 94060 EVALUATION OF WHEEZING: CPT

## 2019-10-09 PROCEDURE — ZZZZZ: CPT

## 2019-10-09 PROCEDURE — 74230 X-RAY XM SWLNG FUNCJ C+: CPT | Mod: 26

## 2019-10-09 PROCEDURE — 94729 DIFFUSING CAPACITY: CPT

## 2019-10-09 NOTE — REVIEW OF SYSTEMS
[Nasal Congestion] : nasal congestion [Chills] : chills [Cough] : cough [Postnasal Drip] : postnasal drip [Sputum] : sputum  [Dyspnea] : dyspnea [Hay Fever] : hay fever [Wheezing] : wheezing [Nasal Discharge] : nasal discharge [Itchy Eyes] : itching of ~T the eyes [Myalgias] : myalgias [Focal Weakness] : focal weakness [Negative] : Cardiovascular [Poor Appetite] : normal appetite  [Fever] : no fever [Sore Throat] : no sore throat [Pleuritic Pain] : no pleuritic pain [Headache] : no headache [Chest Tightness] : no chest tightness [Hemoptysis] : no hemoptysis [Dizziness] : no dizziness [de-identified] : b/l LE weakness

## 2019-10-09 NOTE — PHYSICAL EXAM
[Normal Appearance] : normal appearance [General Appearance - Well Developed] : well developed [Well Groomed] : well groomed [Neck Appearance] : the appearance of the neck was normal [Neck Cervical Mass (___cm)] : no neck mass was observed [Heart Rate And Rhythm] : heart rate and rhythm were normal [Murmurs] : no murmurs present [Heart Sounds] : normal S1 and S2 [Arterial Pulses Normal] : the arterial pulses were normal [Edema] : no peripheral edema present [Respiration, Rhythm And Depth] : normal respiratory rhythm and effort [] : no respiratory distress [Exaggerated Use Of Accessory Muscles For Inspiration] : no accessory muscle use [Auscultation Breath Sounds / Voice Sounds] : lungs were clear to auscultation bilaterally [Abdomen Tenderness] : non-tender [Abdomen Soft] : soft [Cyanosis, Localized] : no localized cyanosis [Nail Clubbing] : no clubbing of the fingernails [Abdomen Mass (___ Cm)] : no abdominal mass palpated [Oriented To Time, Place, And Person] : oriented to person, place, and time [Mood] : the mood was normal [Affect] : the affect was normal [FreeTextEntry1] : thin appearance

## 2019-10-09 NOTE — ASSESSMENT
[FreeTextEntry1] : This is a 53 y/o F with post-polio syndrome who presents with AGUILERA and chronic cough. PFTs today show restrictive pattern, with reduced but likely underestimated DLCO, likely 2/2 muscular weakness from post-polio syndrome.\par \par Dyspnea\par - PFTs consistent with restrictive physiology, suspect 2/2 muscular weakness from post-polio syndrome, although unusual without chest wall deformity\par - trial of albuterol prn\par \par Chronic cough\par - likely multifactorial, including chronic aspiration and possibly allergic rhinitis\par - speech/swallow evaluation revealed aspiration with thin liquids, ordered thick-and-easy packets for thickened liquids at home\par - fluticasone nasal spray daily\par \par RTC in 3 months\par \par

## 2019-10-09 NOTE — HISTORY OF PRESENT ILLNESS
[FreeTextEntry1] : Patient is a 53 y/o F from [facility] with PMHx of allergic rhinitis, unrepaired cleft palate and post-polio syndrome with resulting b/l LE weakness who presents for f/u after initial visit for cough and AGUILERA. Patient was referred for PFTs, which she performed today.\par \par Patient reports her AGUILERA has been present for many years, and she feels as if she "can't catch her breath." Reportedly improved with albuterol inhaler. Also endorses wheezing and states that her wheezing gets worse during the cold months.\par \par Cough she reports being present for 6 years, since she moved to the . Endorses chronic allergies and post-nasal drip, however denies any GERD symptoms. Cough is also worse in the colder months. Went to the ED 4 times in the past year for cough, 1 time she was treated for pneumonia. Currently denies any f/c, SOB at rest, headache, dizziness or chest pain.

## 2019-10-16 DIAGNOSIS — R13.12 DYSPHAGIA, OROPHARYNGEAL PHASE: ICD-10-CM

## 2019-11-05 ENCOUNTER — APPOINTMENT (OUTPATIENT)
Dept: INTERNAL MEDICINE | Facility: CLINIC | Age: 54
End: 2019-11-05

## 2019-12-13 ENCOUNTER — APPOINTMENT (OUTPATIENT)
Dept: INTERNAL MEDICINE | Facility: CLINIC | Age: 54
End: 2019-12-13

## 2019-12-19 ENCOUNTER — APPOINTMENT (OUTPATIENT)
Dept: SPEECH THERAPY | Facility: CLINIC | Age: 54
End: 2019-12-19
Payer: MEDICAID

## 2019-12-19 PROCEDURE — 92526 ORAL FUNCTION THERAPY: CPT | Mod: GN

## 2020-01-02 ENCOUNTER — OTHER (OUTPATIENT)
Age: 55
End: 2020-01-02

## 2020-01-06 ENCOUNTER — APPOINTMENT (OUTPATIENT)
Dept: SPEECH THERAPY | Facility: CLINIC | Age: 55
End: 2020-01-06

## 2020-01-09 ENCOUNTER — APPOINTMENT (OUTPATIENT)
Dept: GASTROENTEROLOGY | Facility: CLINIC | Age: 55
End: 2020-01-09

## 2020-01-14 ENCOUNTER — APPOINTMENT (OUTPATIENT)
Dept: SPEECH THERAPY | Facility: CLINIC | Age: 55
End: 2020-01-14
Payer: MEDICAID

## 2020-01-14 PROCEDURE — 92526 ORAL FUNCTION THERAPY: CPT | Mod: GN

## 2020-01-16 ENCOUNTER — OUTPATIENT (OUTPATIENT)
Dept: OUTPATIENT SERVICES | Facility: HOSPITAL | Age: 55
LOS: 1 days | End: 2020-01-16

## 2020-01-16 ENCOUNTER — LABORATORY RESULT (OUTPATIENT)
Age: 55
End: 2020-01-16

## 2020-01-16 ENCOUNTER — APPOINTMENT (OUTPATIENT)
Dept: INTERNAL MEDICINE | Facility: CLINIC | Age: 55
End: 2020-01-16
Payer: MEDICAID

## 2020-01-16 VITALS
SYSTOLIC BLOOD PRESSURE: 110 MMHG | HEIGHT: 62 IN | WEIGHT: 108 LBS | HEART RATE: 86 BPM | DIASTOLIC BLOOD PRESSURE: 52 MMHG | BODY MASS INDEX: 19.88 KG/M2 | OXYGEN SATURATION: 99 %

## 2020-01-16 DIAGNOSIS — R06.02 SHORTNESS OF BREATH: ICD-10-CM

## 2020-01-16 DIAGNOSIS — Z98.89 OTHER SPECIFIED POSTPROCEDURAL STATES: Chronic | ICD-10-CM

## 2020-01-16 LAB — HBA1C BLD-MCNC: 5.5 % — SIGNIFICANT CHANGE UP (ref 4–5.6)

## 2020-01-16 PROCEDURE — 99214 OFFICE O/P EST MOD 30 MIN: CPT | Mod: GC

## 2020-01-16 RX ORDER — CETIRIZINE HYDROCHLORIDE 10 MG/1
10 TABLET, COATED ORAL
Qty: 1 | Refills: 1 | Status: DISCONTINUED | COMMUNITY
Start: 2019-02-11 | End: 2020-01-16

## 2020-01-16 RX ORDER — EPINEPHRINE 0.3 MG/.3ML
0.3 INJECTION INTRAMUSCULAR
Qty: 2 | Refills: 2 | Status: DISCONTINUED | COMMUNITY
Start: 2019-02-11 | End: 2020-01-16

## 2020-01-16 NOTE — PHYSICAL EXAM
[No Acute Distress] : no acute distress [Well-Appearing] : well-appearing [Normal Sclera/Conjunctiva] : normal sclera/conjunctiva [EOMI] : extraocular movements intact [Normal Outer Ear/Nose] : the outer ears and nose were normal in appearance [No Lymphadenopathy] : no lymphadenopathy [Supple] : supple [Thyroid Normal, No Nodules] : the thyroid was normal and there were no nodules present [No Joint Swelling] : no joint swelling [No Spinal Tenderness] : no spinal tenderness [Normal] : affect was normal and insight and judgment were intact [No Rash] : no rash [de-identified] : decreased strength lower extremity B/L [de-identified] : decreased strength lower extremity B/L, +wheel chair bound, + right leg brace

## 2020-01-16 NOTE — PLAN
[FreeTextEntry1] : Case seen and discussed with Dr. Basurto\par \par RTC in 3 months\par \par \par Deb Min \par PGY-1 [employer]

## 2020-01-16 NOTE — HISTORY OF PRESENT ILLNESS
[FreeTextEntry1] : Follow up of chronic medical conditions [de-identified] : 54 year old woman with unrepaired congenital cleft palate (bulbar weakness), left pes equinus and post-polio syndrome (right LE brace, functional paraplegia, wheelchair bound) who presents for follow-up visit for chronic issues; dysphagia and SOB w/ chronic cough. Regarding dysphagia, patient states she was recently evaluated by speech pathology now s/p modified barium swallow 10/2019 after which patient was advised to a thickened liquid diet. Patient states she consumes thick liquids but also consumes solids such as bread and rice. Patient understands risks of aspiration. Regarding SOB and chronic cough, patient states it is worse with the cold and strong perfumes. She states she was recently evaluated by pulm and started on fluticasone nasal spray which significantly improves symptoms. At this time she denies fevers, chills, CP, SOB, N/V, Constipation or diarrhea. \par \par

## 2020-01-16 NOTE — REVIEW OF SYSTEMS
[Shortness Of Breath] : shortness of breath [Cough] : cough [Joint Pain] : joint pain [Muscle Pain] : muscle pain [Back Pain] : back pain [Unsteady Walking] : ataxia [Depression] : depression [Anxiety] : anxiety [Fever] : no fever [Chills] : no chills [Fatigue] : no fatigue [Night Sweats] : no night sweats [Recent Change In Weight] : ~T no recent weight change [Discharge] : no discharge [Pain] : no pain [Vision Problems] : no vision problems [Itching] : no itching [Earache] : no earache [Nasal Discharge] : no nasal discharge [Chest Pain] : no chest pain [Palpitations] : no palpitations [Orthopnea] : no orthopnea [Paroxysmal Nocturnal Dyspnea] : no paroxysmal nocturnal dyspnea [Wheezing] : no wheezing [Abdominal Pain] : no abdominal pain [Nausea] : no nausea [Constipation] : no constipation [Diarrhea] : diarrhea [Vomiting] : no vomiting [Dysuria] : no dysuria [Incontinence] : no incontinence [Hematuria] : no hematuria [Frequency] : no frequency [Joint Stiffness] : no joint stiffness [Joint Swelling] : no joint swelling [Itching] : no itching [Skin Rash] : no skin rash [Headache] : no headache [Dizziness] : no dizziness [Memory Loss] : no memory loss [Suicidal] : not suicidal [Insomnia] : no insomnia

## 2020-01-16 NOTE — END OF VISIT
[] : Resident [FreeTextEntry3] : 54F with history of cleft palate, post-polio syndrome with LE weakness b/l who is wheelchair bound presents for follow up of chronic medical conditions. Has been following with speech pathology for dysphagia-recommended thickened liquids-but patient continues to consume various consistencies. Otherwise has been feeling ok, recent pulmonary evaluation c/w restrictive lung disease. BP normal, exam with wheelchair bound woman with dysphonia, awake and alert and in no distress. Discussed aspiration risks of not consuming thickened liquids. HCM-discussed CRC screening, breast cancer screening, and cervical cancer screening-she is amenable to all, needs patient navigation as she is unable to make appts by phone due to her dysphonia-referral to office staff/pt navigation for help with scheduling.

## 2020-01-17 ENCOUNTER — OTHER (OUTPATIENT)
Age: 55
End: 2020-01-17

## 2020-01-17 DIAGNOSIS — M25.50 PAIN IN UNSPECIFIED JOINT: ICD-10-CM

## 2020-01-17 DIAGNOSIS — R06.02 SHORTNESS OF BREATH: ICD-10-CM

## 2020-01-17 DIAGNOSIS — Z00.00 ENCOUNTER FOR GENERAL ADULT MEDICAL EXAMINATION WITHOUT ABNORMAL FINDINGS: ICD-10-CM

## 2020-01-17 DIAGNOSIS — R05 COUGH: ICD-10-CM

## 2020-01-17 DIAGNOSIS — F32.9 MAJOR DEPRESSIVE DISORDER, SINGLE EPISODE, UNSPECIFIED: ICD-10-CM

## 2020-01-17 LAB — HIV 1+2 AB+HIV1 P24 AG SERPL QL IA: SIGNIFICANT CHANGE UP

## 2020-01-22 ENCOUNTER — APPOINTMENT (OUTPATIENT)
Dept: PULMONOLOGY | Facility: CLINIC | Age: 55
End: 2020-01-22

## 2020-01-23 ENCOUNTER — APPOINTMENT (OUTPATIENT)
Dept: SPEECH THERAPY | Facility: CLINIC | Age: 55
End: 2020-01-23

## 2020-01-28 ENCOUNTER — APPOINTMENT (OUTPATIENT)
Dept: OTOLARYNGOLOGY | Facility: CLINIC | Age: 55
End: 2020-01-28
Payer: MEDICAID

## 2020-01-28 VITALS
WEIGHT: 108 LBS | BODY MASS INDEX: 19.88 KG/M2 | HEIGHT: 62 IN | DIASTOLIC BLOOD PRESSURE: 55 MMHG | HEART RATE: 77 BPM | SYSTOLIC BLOOD PRESSURE: 100 MMHG

## 2020-01-28 DIAGNOSIS — R49.0 DYSPHONIA: ICD-10-CM

## 2020-01-28 DIAGNOSIS — R13.10 DYSPHAGIA, UNSPECIFIED: ICD-10-CM

## 2020-01-28 PROCEDURE — 99243 OFF/OP CNSLTJ NEW/EST LOW 30: CPT | Mod: 25

## 2020-01-28 PROCEDURE — 31575 DIAGNOSTIC LARYNGOSCOPY: CPT

## 2020-01-28 NOTE — HISTORY OF PRESENT ILLNESS
[de-identified] : 54 year old female referred by Yvette Hernández, speech pathologist, for odynophagia, and voice hoarseness.  [facility] has been seeing Yvette Hernández for swallow therapy.   [facility] she was born with a congenital cleft palate.  She was seen by Dr. Cornelius (Plastic Surgery) in the past and cleft palate repair was being considered.  [facility] she had polio at less than [age] year of age, born in [facility].

## 2020-01-28 NOTE — PROCEDURE
[Hoarseness] : hoarseness not clearly evaluated by indirect laryngoscopy [None] : none [Flexible Endoscope] : examined with the flexible endoscope [Serial Number: ___] : Serial Number: [unfilled] [Normal] : posterior cricoid area had healthy pink mucosa in the interarytenoid area and the esophageal inlet [True Vocal Cords Paralysis] : no true vocal cord paralysis [True Vocal Cords Erythematous] : no true vocal cord edema [True Vocal Cords Cheney's Nodules] : no true vocal cord nodules [Glottis Arytenoid Cartilages] : no arytenoid ulcerations [Glottis Arytenoid Cartilages Erythema] : no arytenoid erythema [FreeTextEntry3] : Cleft palate present with wide open NP [Arytenoid Erythema ___ /4] : arytenoid erythema [unfilled]U/4

## 2020-01-28 NOTE — CONSULT LETTER
[Dear  ___] : Dear  [unfilled], [Courtesy Letter:] : I had the pleasure of seeing your patient, [unfilled], in my office today. [Please see my note below.] : Please see my note below. [Sincerely,] : Sincerely, [FreeTextEntry3] : David Byrnes MD, FACS\par Clinical \par Dept. of Otolaryngology\par [facility]\par  [FreeTextEntry2] : Zia Vázquez MD\par  [DrSea  ___] : Dr. TRAVIS

## 2020-01-28 NOTE — REASON FOR VISIT
[Initial Consultation] : an initial consultation for [Family Member] : family member [FreeTextEntry2] : referred by Yvette Hernández, speech pathologist, for odynophagia, and voice hoarseness

## 2020-01-28 NOTE — PHYSICAL EXAM
[Midline] : trachea located in midline position [Normal] : no rashes [de-identified] : Soft palate cleft present.  Pt appears to have presence of palate musculature laterally on both sides.

## 2020-01-28 NOTE — ASSESSMENT
[FreeTextEntry1] : Mild arytenoid erythema present without other laryngeal abnormality seen.\par \par Pt's main issue appears to be her cleft palate.   I have encouraged her to f/u with Plastic Surgery.

## 2020-02-12 DIAGNOSIS — R49.0 DYSPHONIA: ICD-10-CM

## 2020-02-12 DIAGNOSIS — Q35.9 CLEFT PALATE, UNSPECIFIED: ICD-10-CM

## 2020-02-20 ENCOUNTER — APPOINTMENT (OUTPATIENT)
Dept: OBGYN | Facility: HOSPITAL | Age: 55
End: 2020-02-20
Payer: MEDICAID

## 2020-02-20 ENCOUNTER — LABORATORY RESULT (OUTPATIENT)
Age: 55
End: 2020-02-20

## 2020-02-20 ENCOUNTER — OUTPATIENT (OUTPATIENT)
Dept: OUTPATIENT SERVICES | Facility: HOSPITAL | Age: 55
LOS: 1 days | End: 2020-02-20

## 2020-02-20 ENCOUNTER — RESULT REVIEW (OUTPATIENT)
Age: 55
End: 2020-02-20

## 2020-02-20 VITALS
WEIGHT: 107.25 LBS | DIASTOLIC BLOOD PRESSURE: 67 MMHG | HEIGHT: 63 IN | SYSTOLIC BLOOD PRESSURE: 108 MMHG | HEART RATE: 77 BPM | BODY MASS INDEX: 19 KG/M2

## 2020-02-20 DIAGNOSIS — Z98.89 OTHER SPECIFIED POSTPROCEDURAL STATES: Chronic | ICD-10-CM

## 2020-02-20 PROCEDURE — 99386 PREV VISIT NEW AGE 40-64: CPT | Mod: GC

## 2020-02-21 LAB
C TRACH RRNA SPEC QL NAA+PROBE: SIGNIFICANT CHANGE UP
HPV HIGH+LOW RISK DNA PNL CVX: SIGNIFICANT CHANGE UP
N GONORRHOEA RRNA SPEC QL NAA+PROBE: SIGNIFICANT CHANGE UP
SPECIMEN SOURCE: SIGNIFICANT CHANGE UP

## 2020-02-22 NOTE — OB HISTORY
[___] : Living: [unfilled] [Pregnancy History] : boy [FreeTextEntry1] : passed away when young 2/2 pool injury

## 2020-02-22 NOTE — PHYSICAL EXAM
[Awake] : awake [Alert] : alert [No Discharge] : no discharge [No Masses] : no breast masses were palpable [Soft] : soft [Scar] : a scar was noted [Mid-line] : in the mid-line [Soft, Nontender] : the abdomen was soft and nontender [No Mass] : no masses were palpated [None] : no CVA tenderness [Oriented x3] : oriented to person, place, and time [Normal] : vagina [Labia Majora] : labia major [Labia Minora] : labia minora [Pink Rugae] : pink rugae [No Bleeding] : there was no active vaginal bleeding [Nulliparous] : was nulliparous [Pap Obtained] : a Pap smear was performed [Anteversion] : anteverted [RRR, No Murmurs] : RRR, no murmurs [CTAB] : CTAB [Acute Distress] : no acute distress [LAD] : no lymphadenopathy [Thyroid Nodule] : no thyroid nodule [Mass] : no breast mass [Goiter] : no goiter [Nipple Discharge] : no nipple discharge [Axillary LAD] : no axillary lymphadenopathy [Tender] : non tender [Distended] : not distended [Depressed Mood] : not depressed [Flat Affect] : affect not flat [Discharge] : had no discharge [Motion Tenderness] : there was no cervical motion tenderness [Dilated] : the cervix was not dilated [FreeTextEntry5] : flat and pinpoint, concern for stenosis [FreeTextEntry7] : small ~5-6 weeks

## 2020-02-22 NOTE — REVIEW OF SYSTEMS
[Difficulty Swallowing] : dysphagia [Pain On Swallowing] : pain on swallowing [Incontinence] : incontinence [Arthralgias] : arthralgias [Joint Pain] : joint pain [Headache] : headache [Nl] : Hematologic/Lymphatic [Pelvic Pain] : no pelvic pain [Abn Vag Bleeding] : no abnormal vaginal bleeding [Frequency] : no frequency [FreeTextEntry3] : difficulty eating and speaking 2/2 hx of unrepaired cleft lip

## 2020-02-22 NOTE — HISTORY OF PRESENT ILLNESS
[___ Year(s) Ago] : [unfilled] year(s) ago [Fair] : being in fair health [Last Mammogram ___] : Last Mammogram was [unfilled] [Last Pap ___] : Last cervical pap smear was [unfilled] [Postmenopausal] : is postmenopausal [Up to Date] : not up to date with ~his/her~ STD screening [Fever] : no fever [Nausea] : no nausea [Vomiting] : no vomiting [Diarrhea] : no diarrhea [Pelvic Pressure] : no pelvic pressure [Vaginal Bleeding] : no vaginal bleeding [Dysuria] : no dysuria

## 2020-02-24 DIAGNOSIS — Z01.419 ENCOUNTER FOR GYNECOLOGICAL EXAMINATION (GENERAL) (ROUTINE) WITHOUT ABNORMAL FINDINGS: ICD-10-CM

## 2020-02-24 DIAGNOSIS — Z00.00 ENCOUNTER FOR GENERAL ADULT MEDICAL EXAMINATION WITHOUT ABNORMAL FINDINGS: ICD-10-CM

## 2020-02-24 LAB — CYTOLOGY SPEC DOC CYTO: SIGNIFICANT CHANGE UP

## 2020-03-26 ENCOUNTER — APPOINTMENT (OUTPATIENT)
Dept: INTERNAL MEDICINE | Facility: CLINIC | Age: 55
End: 2020-03-26

## 2020-04-28 RX ORDER — ALBUTEROL SULFATE 90 UG/1
108 (90 BASE) AEROSOL, METERED RESPIRATORY (INHALATION)
Qty: 1 | Refills: 4 | Status: DISCONTINUED | COMMUNITY
Start: 2019-10-09 | End: 2020-04-28

## 2020-07-24 ENCOUNTER — RX RENEWAL (OUTPATIENT)
Age: 55
End: 2020-07-24

## 2020-09-23 ENCOUNTER — RX RENEWAL (OUTPATIENT)
Age: 55
End: 2020-09-23

## 2020-10-21 ENCOUNTER — LABORATORY RESULT (OUTPATIENT)
Age: 55
End: 2020-10-21

## 2020-10-21 ENCOUNTER — OUTPATIENT (OUTPATIENT)
Dept: OUTPATIENT SERVICES | Facility: HOSPITAL | Age: 55
LOS: 1 days | End: 2020-10-21

## 2020-10-21 ENCOUNTER — RX RENEWAL (OUTPATIENT)
Age: 55
End: 2020-10-21

## 2020-10-21 ENCOUNTER — APPOINTMENT (OUTPATIENT)
Dept: INTERNAL MEDICINE | Facility: CLINIC | Age: 55
End: 2020-10-21
Payer: MEDICAID

## 2020-10-21 VITALS
DIASTOLIC BLOOD PRESSURE: 70 MMHG | BODY MASS INDEX: 18.96 KG/M2 | OXYGEN SATURATION: 99 % | HEIGHT: 63 IN | HEART RATE: 77 BPM | WEIGHT: 107 LBS | SYSTOLIC BLOOD PRESSURE: 92 MMHG

## 2020-10-21 VITALS — TEMPERATURE: 99 F

## 2020-10-21 DIAGNOSIS — Z98.89 OTHER SPECIFIED POSTPROCEDURAL STATES: Chronic | ICD-10-CM

## 2020-10-21 DIAGNOSIS — Q35.9 CLEFT PALATE, UNSPECIFIED: ICD-10-CM

## 2020-10-21 LAB
ALBUMIN SERPL ELPH-MCNC: 4.4 G/DL — SIGNIFICANT CHANGE UP (ref 3.3–5)
ALP SERPL-CCNC: 42 U/L — SIGNIFICANT CHANGE UP (ref 40–120)
ALT FLD-CCNC: 18 U/L — SIGNIFICANT CHANGE UP (ref 4–33)
ANION GAP SERPL CALC-SCNC: 9 MMO/L — SIGNIFICANT CHANGE UP (ref 7–14)
AST SERPL-CCNC: 31 U/L — SIGNIFICANT CHANGE UP (ref 4–32)
BASOPHILS # BLD AUTO: 0.06 K/UL — SIGNIFICANT CHANGE UP (ref 0–0.2)
BASOPHILS NFR BLD AUTO: 1 % — SIGNIFICANT CHANGE UP (ref 0–2)
BILIRUB SERPL-MCNC: 0.3 MG/DL — SIGNIFICANT CHANGE UP (ref 0.2–1.2)
BUN SERPL-MCNC: 12 MG/DL — SIGNIFICANT CHANGE UP (ref 7–23)
CALCIUM SERPL-MCNC: 9.6 MG/DL — SIGNIFICANT CHANGE UP (ref 8.4–10.5)
CHLORIDE SERPL-SCNC: 104 MMOL/L — SIGNIFICANT CHANGE UP (ref 98–107)
CO2 SERPL-SCNC: 27 MMOL/L — SIGNIFICANT CHANGE UP (ref 22–31)
CREAT SERPL-MCNC: 0.55 MG/DL — SIGNIFICANT CHANGE UP (ref 0.5–1.3)
EOSINOPHIL # BLD AUTO: 0.25 K/UL — SIGNIFICANT CHANGE UP (ref 0–0.5)
EOSINOPHIL NFR BLD AUTO: 4.1 % — SIGNIFICANT CHANGE UP (ref 0–6)
GLUCOSE SERPL-MCNC: 106 MG/DL — HIGH (ref 70–99)
HCT VFR BLD CALC: 37.4 % — SIGNIFICANT CHANGE UP (ref 34.5–45)
HGB BLD-MCNC: 11.9 G/DL — SIGNIFICANT CHANGE UP (ref 11.5–15.5)
IMM GRANULOCYTES NFR BLD AUTO: 0.2 % — SIGNIFICANT CHANGE UP (ref 0–1.5)
LYMPHOCYTES # BLD AUTO: 2.65 K/UL — SIGNIFICANT CHANGE UP (ref 1–3.3)
LYMPHOCYTES # BLD AUTO: 43.9 % — SIGNIFICANT CHANGE UP (ref 13–44)
MCHC RBC-ENTMCNC: 28.5 PG — SIGNIFICANT CHANGE UP (ref 27–34)
MCHC RBC-ENTMCNC: 31.8 % — LOW (ref 32–36)
MCV RBC AUTO: 89.7 FL — SIGNIFICANT CHANGE UP (ref 80–100)
MONOCYTES # BLD AUTO: 0.32 K/UL — SIGNIFICANT CHANGE UP (ref 0–0.9)
MONOCYTES NFR BLD AUTO: 5.3 % — SIGNIFICANT CHANGE UP (ref 2–14)
NEUTROPHILS # BLD AUTO: 2.75 K/UL — SIGNIFICANT CHANGE UP (ref 1.8–7.4)
NEUTROPHILS NFR BLD AUTO: 45.5 % — SIGNIFICANT CHANGE UP (ref 43–77)
NRBC # FLD: 0 K/UL — SIGNIFICANT CHANGE UP (ref 0–0)
PLATELET # BLD AUTO: 257 K/UL — SIGNIFICANT CHANGE UP (ref 150–400)
PMV BLD: 11.9 FL — SIGNIFICANT CHANGE UP (ref 7–13)
POTASSIUM SERPL-MCNC: 3.6 MMOL/L — SIGNIFICANT CHANGE UP (ref 3.5–5.3)
POTASSIUM SERPL-SCNC: 3.6 MMOL/L — SIGNIFICANT CHANGE UP (ref 3.5–5.3)
PROT SERPL-MCNC: 7 G/DL — SIGNIFICANT CHANGE UP (ref 6–8.3)
RBC # BLD: 4.17 M/UL — SIGNIFICANT CHANGE UP (ref 3.8–5.2)
RBC # FLD: 11.9 % — SIGNIFICANT CHANGE UP (ref 10.3–14.5)
SODIUM SERPL-SCNC: 140 MMOL/L — SIGNIFICANT CHANGE UP (ref 135–145)
WBC # BLD: 6.04 K/UL — SIGNIFICANT CHANGE UP (ref 3.8–10.5)
WBC # FLD AUTO: 6.04 K/UL — SIGNIFICANT CHANGE UP (ref 3.8–10.5)

## 2020-10-21 PROCEDURE — 99213 OFFICE O/P EST LOW 20 MIN: CPT | Mod: GE

## 2020-10-22 ENCOUNTER — NON-APPOINTMENT (OUTPATIENT)
Age: 55
End: 2020-10-22

## 2020-10-22 RX ORDER — ALBUTEROL SULFATE 90 UG/1
108 (90 BASE) INHALANT RESPIRATORY (INHALATION)
Qty: 8.5 | Refills: 0 | Status: DISCONTINUED | COMMUNITY
Start: 2020-04-28 | End: 2020-10-22

## 2020-10-22 RX ORDER — DULOXETINE HYDROCHLORIDE 30 MG/1
30 CAPSULE, DELAYED RELEASE PELLETS ORAL
Qty: 90 | Refills: 3 | Status: DISCONTINUED | COMMUNITY
Start: 2017-01-24 | End: 2020-10-22

## 2020-10-22 RX ORDER — LEVALBUTEROL HYDROCHLORIDE 1.25 MG/3ML
1.25 SOLUTION RESPIRATORY (INHALATION)
Qty: 1 | Refills: 3 | Status: DISCONTINUED | COMMUNITY
Start: 2020-10-22 | End: 2020-10-22

## 2020-10-23 ENCOUNTER — NON-APPOINTMENT (OUTPATIENT)
Age: 55
End: 2020-10-23

## 2020-10-25 NOTE — REVIEW OF SYSTEMS
[Wheezing] : wheezing [Cough] : cough [Joint Pain] : joint pain [Joint Stiffness] : joint stiffness [Muscle Pain] : muscle pain [Back Pain] : back pain [Fever] : no fever [Chills] : no chills [Fatigue] : no fatigue [Discharge] : no discharge [Earache] : no earache [Chest Pain] : no chest pain [Palpitations] : no palpitations [Lower Ext Edema] : no lower extremity edema [Shortness Of Breath] : no shortness of breath [Abdominal Pain] : no abdominal pain [Nausea] : no nausea [Constipation] : no constipation [Diarrhea] : diarrhea [Vomiting] : no vomiting [Dysuria] : no dysuria [Joint Swelling] : no joint swelling [Skin Rash] : no skin rash [Headache] : no headache [Memory Loss] : no memory loss [Anxiety] : no anxiety [Depression] : no depression

## 2020-10-25 NOTE — HISTORY OF PRESENT ILLNESS
[FreeTextEntry1] : Back pain  [de-identified] : 55 year old F with unrepaired congenital cleft palate (bulbar weakness), left pes equinus and post-polio syndrome (right LE brace, functional paraplegia, wheelchair bound) who presents for follow-up visit. Patient at this time endorses back pain, states it has been present for a few years but is progressively worsening. Unable to describe pain but states it is located in her lower back, worse in the morning but improves as the day progresses. Also improves with Tylenol, Alleve and Advil. Patient  also endorses pain in her joints; knees and wrists. States moving on the wheelchair makes these worse. Has been in the process of trying to obtain an electric scooter. Patient also endorses depressed mood. Has been thinking a lot of her   and son which causes feelings of sadness. Also endorses increasing anxiety recently due to COVID as well as decreased appetite. States she discontinued Cymbalta a month ago as it was causing her to be too sleepy. Denies fevers, chills, CP, SOB, N/V, dizziness/light headedness, Constipation or diarrhea. \par

## 2020-10-25 NOTE — ADDENDUM
[FreeTextEntry1] : Total time spent on encounter: 45 minutes\par \par RTC in 5 weeks\par \par Case discussed with Dr. Corona\par \par \par Deb Min\par PGY-2 Firm 2

## 2020-10-26 DIAGNOSIS — Z23 ENCOUNTER FOR IMMUNIZATION: ICD-10-CM

## 2020-10-26 DIAGNOSIS — A80.9 ACUTE POLIOMYELITIS, UNSPECIFIED: ICD-10-CM

## 2020-10-26 DIAGNOSIS — Q35.9 CLEFT PALATE, UNSPECIFIED: ICD-10-CM

## 2020-10-26 DIAGNOSIS — F32.9 MAJOR DEPRESSIVE DISORDER, SINGLE EPISODE, UNSPECIFIED: ICD-10-CM

## 2020-10-26 DIAGNOSIS — M25.50 PAIN IN UNSPECIFIED JOINT: ICD-10-CM

## 2020-10-26 DIAGNOSIS — Z00.00 ENCOUNTER FOR GENERAL ADULT MEDICAL EXAMINATION WITHOUT ABNORMAL FINDINGS: ICD-10-CM

## 2020-10-26 DIAGNOSIS — R05 COUGH: ICD-10-CM

## 2020-11-25 ENCOUNTER — APPOINTMENT (OUTPATIENT)
Dept: INTERNAL MEDICINE | Facility: CLINIC | Age: 55
End: 2020-11-25
Payer: MEDICAID

## 2020-11-25 ENCOUNTER — OUTPATIENT (OUTPATIENT)
Dept: OUTPATIENT SERVICES | Facility: HOSPITAL | Age: 55
LOS: 1 days | End: 2020-11-25

## 2020-11-25 VITALS — TEMPERATURE: 97.8 F

## 2020-11-25 VITALS
SYSTOLIC BLOOD PRESSURE: 100 MMHG | BODY MASS INDEX: 19.84 KG/M2 | WEIGHT: 112 LBS | HEART RATE: 89 BPM | DIASTOLIC BLOOD PRESSURE: 60 MMHG | HEIGHT: 63 IN | OXYGEN SATURATION: 98 %

## 2020-11-25 DIAGNOSIS — M25.50 PAIN IN UNSPECIFIED JOINT: ICD-10-CM

## 2020-11-25 DIAGNOSIS — R05 COUGH: ICD-10-CM

## 2020-11-25 DIAGNOSIS — Z98.89 OTHER SPECIFIED POSTPROCEDURAL STATES: Chronic | ICD-10-CM

## 2020-11-25 DIAGNOSIS — M54.2 CERVICALGIA: ICD-10-CM

## 2020-11-25 DIAGNOSIS — A80.9 ACUTE POLIOMYELITIS, UNSPECIFIED: ICD-10-CM

## 2020-11-25 DIAGNOSIS — F32.9 MAJOR DEPRESSIVE DISORDER, SINGLE EPISODE, UNSPECIFIED: ICD-10-CM

## 2020-11-25 PROCEDURE — 99213 OFFICE O/P EST LOW 20 MIN: CPT | Mod: GE

## 2020-11-25 NOTE — REVIEW OF SYSTEMS
[Wheezing] : wheezing [Joint Pain] : joint pain [Joint Stiffness] : joint stiffness [Muscle Pain] : muscle pain [Fever] : no fever [Chills] : no chills [Fatigue] : no fatigue [Earache] : no earache [Hearing Loss] : no hearing loss [Chest Pain] : no chest pain [Palpitations] : no palpitations [Orthopnea] : no orthopnea [Paroxysmal Nocturnal Dyspnea] : no paroxysmal nocturnal dyspnea [Shortness Of Breath] : no shortness of breath [Cough] : no cough [Abdominal Pain] : no abdominal pain [Nausea] : no nausea [Constipation] : no constipation [Diarrhea] : diarrhea [Vomiting] : no vomiting [Joint Swelling] : no joint swelling [Back Pain] : no back pain [Skin Rash] : no skin rash [Headache] : no headache

## 2020-11-25 NOTE — PHYSICAL EXAM
[No Acute Distress] : no acute distress [Well-Appearing] : well-appearing [Normal Sclera/Conjunctiva] : normal sclera/conjunctiva [Normal Outer Ear/Nose] : the outer ears and nose were normal in appearance [No Lymphadenopathy] : no lymphadenopathy [Supple] : supple [Normal] : soft, non-tender, non-distended, no masses palpated, no HSM and normal bowel sounds [Normal Mood] : the mood was normal [de-identified] : + [de-identified] : + wheelchair bound

## 2020-11-25 NOTE — PLAN
[FreeTextEntry1] : Total time on encounter: 45 minutes\par \par Case discussed w/ Dr. Basurto\par \par RTC in 3 months \par \par \par Deb Min\par PGY-2. Firm 2.

## 2020-11-25 NOTE — HISTORY OF PRESENT ILLNESS
[FreeTextEntry1] : Neck Pain  [de-identified] : 55 year old F with unrepaired congenital cleft palate (bulbar weakness), left pes equinus and post-polio syndrome (right LE brace, functional paraplegia, wheelchair bound) who presents for follow-up visit. Patient at this time endorses upper neck pain, states she slipped and fell about a week ago and landed on her lower back and right arm. Pt states she had some pain in both lower back and right arm a few days ago which have now resolved.  Also states she started experiencing neck pain s/p fall. Describes pain as a stiffness, intermittent , 5/10 in severity and alleviated by Advil. Patient also endorses pain in her joints; knees and wrists.  Has been in the process of trying to obtain an electric scooter and is currently on the wait list. Patient states mood is improved but does not take sertraline consistently. also endorses depressed mood. . Denies fevers, chills, CP, SOB, N/V, dizziness/light headedness, constipation or diarrhea

## 2021-02-03 ENCOUNTER — APPOINTMENT (OUTPATIENT)
Dept: INTERNAL MEDICINE | Facility: CLINIC | Age: 56
End: 2021-02-03

## 2021-02-24 ENCOUNTER — APPOINTMENT (OUTPATIENT)
Dept: NEPHROLOGY | Facility: CLINIC | Age: 56
End: 2021-02-24

## 2021-02-24 ENCOUNTER — NON-APPOINTMENT (OUTPATIENT)
Age: 56
End: 2021-02-24

## 2021-03-11 ENCOUNTER — NON-APPOINTMENT (OUTPATIENT)
Age: 56
End: 2021-03-11

## 2021-03-19 ENCOUNTER — NON-APPOINTMENT (OUTPATIENT)
Age: 56
End: 2021-03-19

## 2021-03-22 ENCOUNTER — APPOINTMENT (OUTPATIENT)
Dept: INTERNAL MEDICINE | Facility: CLINIC | Age: 56
End: 2021-03-22
Payer: MEDICAID

## 2021-03-22 ENCOUNTER — OUTPATIENT (OUTPATIENT)
Dept: OUTPATIENT SERVICES | Facility: HOSPITAL | Age: 56
LOS: 1 days | End: 2021-03-22

## 2021-03-22 VITALS — HEART RATE: 91 BPM | SYSTOLIC BLOOD PRESSURE: 107 MMHG | DIASTOLIC BLOOD PRESSURE: 78 MMHG | RESPIRATION RATE: 16 BRPM

## 2021-03-22 VITALS — BODY MASS INDEX: 18.96 KG/M2 | HEIGHT: 63 IN | WEIGHT: 107 LBS

## 2021-03-22 DIAGNOSIS — Z98.89 OTHER SPECIFIED POSTPROCEDURAL STATES: Chronic | ICD-10-CM

## 2021-03-22 DIAGNOSIS — J30.1 ALLERGIC RHINITIS DUE TO POLLEN: ICD-10-CM

## 2021-03-22 PROCEDURE — 99214 OFFICE O/P EST MOD 30 MIN: CPT | Mod: GC,95

## 2021-03-26 NOTE — ASSESSMENT
[FreeTextEntry1] : Patient is a 55 year old female w/ pmh of congenital cleft palate (bulbar weakness), lef pes equinus and post polio syndrome (right leg brace, functional paraplegia, wheel chair bound) who presents for follow up for her chronic health issues.

## 2021-03-26 NOTE — HISTORY OF PRESENT ILLNESS
[Home] : at home, [unfilled] , at the time of the visit. [Other Location: e.g. Home (Enter Location, City,State)___] : at [unfilled] [Other:____] : [unfilled] [Verbal consent obtained from patient] : the patient, [unfilled] [Other: _____] : [unfilled] [FreeTextEntry1] : Follow up for chronic conditions  [de-identified] : Patient is a 55 year old female w/ pmh of congenital cleft palate (bulbar weakness), lef pes equinus and post polio syndrome (right leg brace, functional paraplegia, wheel chair bound) who presents for follow up for her chronic health issues. \par \par Fever: The patient reports that she felt feverish yesterday and has had intermittent headaches. This was only 24 hours after receiving her 2nd dose of the covid vaccine. The sx's responded to tylenol appropriately. \par \par Chronic neck and back pain: \par - The patient has no recent falls and reports that she has been doing very well. Performs physical therapy exercises at home for relief and utilizes a bike for assistance this, but is limited by her severe neurological deficits from her polio. She uses a back massager to help with muscle cramps which relieves her symptoms. She requires 2 ibuprofen daily and 2 tylenol which provides relief. \par \par Shortness of breath/Chronic cough: \par - The patient has chronic cough and sob due to restrictive lung disease in the setting of her severe neurological issues. She reports her cough is greatly improved from last visit. She takes her Symbicort twice daily and albuterol once in the morning and sometimes a second time. She was explained the importance of using the albuterol for PRN relief and symbicort regularly. \par \par Polio: \par - Continues to have severe neurological deficits due to polio and is wheel chair bound. \par \par Depression: \par - No SI/HI, well controlled on sertraline 25mg. \par \par Health Maintenance: \par - Awaited second vaccine prior to scheduling health maintenance appointments, plans to schedule it now. All referrals provided at prior appointment. \par \par

## 2021-03-26 NOTE — PLAN
[FreeTextEntry1] : RTC in 3 months for either in person or telephonic visit. \par \par \par Timur Choi MD \par Internal Medicine PGY1 \par \par Case discussed with Dr. Wheat \par \par Total time spent with patient was 41 minutes.

## 2021-03-29 DIAGNOSIS — M25.50 PAIN IN UNSPECIFIED JOINT: ICD-10-CM

## 2021-03-29 DIAGNOSIS — F32.9 MAJOR DEPRESSIVE DISORDER, SINGLE EPISODE, UNSPECIFIED: ICD-10-CM

## 2021-03-29 DIAGNOSIS — A80.9 ACUTE POLIOMYELITIS, UNSPECIFIED: ICD-10-CM

## 2021-03-29 DIAGNOSIS — R05 COUGH: ICD-10-CM

## 2021-03-29 DIAGNOSIS — J30.1 ALLERGIC RHINITIS DUE TO POLLEN: ICD-10-CM

## 2021-03-29 DIAGNOSIS — R13.10 DYSPHAGIA, UNSPECIFIED: ICD-10-CM

## 2021-03-29 DIAGNOSIS — G14 POSTPOLIO SYNDROME: ICD-10-CM

## 2021-03-30 ENCOUNTER — NON-APPOINTMENT (OUTPATIENT)
Age: 56
End: 2021-03-30

## 2021-03-31 ENCOUNTER — NON-APPOINTMENT (OUTPATIENT)
Age: 56
End: 2021-03-31

## 2021-05-18 NOTE — ED PROVIDER NOTE - RATE
Patient called stating her INR was 1.9 last week and today it was 1.7 she said she takes 2.5 mg tuesdays,thursdays and saturdays and 5 mg on sundays,monday,wednedays and fridays.  Wanted to know what you wanted to do if she should change her medication 67

## 2021-05-19 ENCOUNTER — RESULT REVIEW (OUTPATIENT)
Age: 56
End: 2021-05-19

## 2021-05-19 ENCOUNTER — APPOINTMENT (OUTPATIENT)
Dept: MAMMOGRAPHY | Facility: IMAGING CENTER | Age: 56
End: 2021-05-19
Payer: MEDICAID

## 2021-05-19 ENCOUNTER — OUTPATIENT (OUTPATIENT)
Dept: OUTPATIENT SERVICES | Facility: HOSPITAL | Age: 56
LOS: 1 days | End: 2021-05-19
Payer: MEDICAID

## 2021-05-19 DIAGNOSIS — Z00.8 ENCOUNTER FOR OTHER GENERAL EXAMINATION: ICD-10-CM

## 2021-05-19 DIAGNOSIS — Z98.89 OTHER SPECIFIED POSTPROCEDURAL STATES: Chronic | ICD-10-CM

## 2021-05-19 PROCEDURE — 77063 BREAST TOMOSYNTHESIS BI: CPT | Mod: 26

## 2021-05-19 PROCEDURE — 77067 SCR MAMMO BI INCL CAD: CPT

## 2021-05-19 PROCEDURE — 77067 SCR MAMMO BI INCL CAD: CPT | Mod: 26

## 2021-05-19 PROCEDURE — 77063 BREAST TOMOSYNTHESIS BI: CPT

## 2021-08-05 ENCOUNTER — EMERGENCY (EMERGENCY)
Facility: HOSPITAL | Age: 56
LOS: 1 days | Discharge: HOME | End: 2021-08-05
Attending: EMERGENCY MEDICINE | Admitting: EMERGENCY MEDICINE
Payer: MEDICAID

## 2021-08-05 VITALS
TEMPERATURE: 98 F | WEIGHT: 104.94 LBS | RESPIRATION RATE: 16 BRPM | DIASTOLIC BLOOD PRESSURE: 64 MMHG | OXYGEN SATURATION: 100 % | HEART RATE: 79 BPM | SYSTOLIC BLOOD PRESSURE: 113 MMHG | HEIGHT: 62 IN

## 2021-08-05 VITALS
DIASTOLIC BLOOD PRESSURE: 70 MMHG | OXYGEN SATURATION: 100 % | TEMPERATURE: 98 F | RESPIRATION RATE: 16 BRPM | HEART RATE: 71 BPM | SYSTOLIC BLOOD PRESSURE: 105 MMHG

## 2021-08-05 DIAGNOSIS — Z98.89 OTHER SPECIFIED POSTPROCEDURAL STATES: Chronic | ICD-10-CM

## 2021-08-05 PROCEDURE — 73600 X-RAY EXAM OF ANKLE: CPT | Mod: 26,LT

## 2021-08-05 PROCEDURE — 73501 X-RAY EXAM HIP UNI 1 VIEW: CPT | Mod: 26,LT

## 2021-08-05 PROCEDURE — 73552 X-RAY EXAM OF FEMUR 2/>: CPT | Mod: 26,LT

## 2021-08-05 PROCEDURE — 73560 X-RAY EXAM OF KNEE 1 OR 2: CPT | Mod: 26,LT

## 2021-08-05 PROCEDURE — 72100 X-RAY EXAM L-S SPINE 2/3 VWS: CPT | Mod: 26

## 2021-08-05 PROCEDURE — 99284 EMERGENCY DEPT VISIT MOD MDM: CPT

## 2021-08-05 NOTE — ED PROVIDER NOTE - PHYSICAL EXAMINATION
General: NAD  HEENT: NCAT, PERRL  Cardiac: RRR, no murmurs, 2+ peripheral pulses  Chest: CTAB  Abdomen: soft, non-distended, bowel sounds present, no ttp, no rebound or guarding  Extremities: no peripheral edema, calf tenderness, or leg size discrepancies, however pt endorses sharp shooting pain radiating from her left hip down her entire left lower extremity.   Skin: no rashes  Neuro: AAOx3, motor 5/5 in upper extremities b/l, 4/5 in right lower extremity at baseline from polio, and 4/5 in left lower extremity from recent injury  Psych: mood and affect appropriate

## 2021-08-05 NOTE — ED ADULT NURSE NOTE - CHIEF COMPLAINT QUOTE
c/o nontraumatic left hip pain radiating down left leg x 4 days. Pt also c/o dizziness, describing as room is spinning since riding in car on way to ED. Hx polio, cleft palate (pt's speech is at baseline). Pt walks with braces and cane at baseline but having difficulty walking due to pain. QH=300

## 2021-08-05 NOTE — ED PROVIDER NOTE - WR ORDER STATUS 5
Conjunctivitis, Bacterial    You have an infection in the membranes covering the white part of the eye. This part of the eye is called the conjunctiva. The infection is called conjunctivitis. The most common symptoms of conjunctivitis include a thick, pus-like discharge from the eye, swollen eyelids, redness, eyelids sticking together upon awakening, and a gritty or scratchy feeling in the eye. Your infection was caused by bacteria. It may be treated with medicine. With treatment, the infection takes about 7 to 10 days to resolve.  Home care  · Use prescribed antibiotic eye drops or ointment as directed to treat the infection.  · Apply a warm compress (towel soaked in warm water) to the affected eye 3 to 4 times a day. Do this just before applying medicine to the eye.  · Use a warm, wet cloth to wipe away crusting of the eyelids in the morning. This is caused by mucus drainage during the night. You may also use saline irrigating solution or artificial tears to rinse away mucus in the eye. Do not put a patch over the eye.  · Wash your hands before and after touching the infected eye. This is to prevent spreading the infection to the other eye, and to other people. Do not share your towels or washcloths with others.  · You may use acetaminophen or ibuprofen to control pain, unless another medicine was prescribed. (Note: If you have chronic liver or kidney disease or have ever had a stomach ulcer or gastrointestinal bleeding, talk with your doctor before using these medicines.)  · Do not wear contact lenses until your eyes have healed and all symptoms are gone.  Follow-up care  Follow up with your healthcare provider, or as advised.  When to seek medical advice  Call your healthcare provider right away if any of these occur:  · Worsening vision  · Increasing pain in the eye  · Increasing swelling or redness of the eyelid  · Redness spreading around the eye  Date Last Reviewed: 6/14/2015  © 5797-4504 [employer]. [address] [place] [address]. All rights reserved. This information is not intended as a substitute for professional medical care. Always follow your healthcare professional's instructions.        Conjunctivitis, Nonspecific (Child)  The conjunctiva is a thin membrane that covers the eye and the inside of the eyelids. It can become irritated. If no reason for this inflammation is found, it is called nonspecific conjunctivitis.  When the conjunctiva becomes inflamed, the eye appears reddened. Small blood vessels are visible up close. The eye may have a clear or white, cloudy discharge. The eyelids may be swollen and red. There may be morning crusting around the eye. Most likely, the conjunctivitis was caused by a brief irritation. The irritated eye is treated with a soothing nonprescription ointment or eye drops.  Home care    Medicines: The healthcare provider may prescribe medicine to ease eye irritation. Follow the healthcare providers instructions for giving this medicine to your child.  · Wash your hands well with soap and warm water before and after caring for your childs eye.  · It is common for discharge to form crusts around the eye. Gently wipe crusts away with a wet swab or a clean, warm, damp washcloth. Wipe from the nose toward the ear. This is to keep the eye as clean as possible.  · Try to prevent your child from rubbing the eye.  To apply ointment or eye drops:  1. Have your child lie down on his or her back.  2. Using eye drops: Apply drops in the corner of the eye, where the eyelid meets the nose. The drops will pool in this area. When your child blinks or opens his or her lids, the drops will flow into the eye. Give the exact number of drops prescribed. Be careful not to touch the eye or eyelashes with the dropper.  3. Using ointment: If both drops and ointment are prescribed, give the drops first. Wait 3 minutes, and then apply the ointment. Doing this will give each  medicine time to work. To apply the ointment, start by gently pulling down the lower lid. Place a thin line of ointment along the inside of the lid. Begin at the nose and move outward. Close the lid. Wipe away excess medicine from the nose outward. This is to keep the eye as clean as possible. Have your child keep the eye closed for 1 or 2 minutes so the medicine has time to coat the eye. Eye ointment may cause blurry vision. This is normal. Apply ointment right before your child goes to sleep. In infants, the ointment may be easier to apply while your child is sleeping.  4. Wipe away excess medicine with a clean cloth.  Follow-up care  Follow up with your childs healthcare provider, or as advised.  When to seek medical advice  For a usually healthy child, call the healthcare provider right away if any of these occur:  · Your child is 3 months old or younger and has a fever of 100.4°F (38°C) or higher (Get medical care right away. Fever in a young baby can be a sign of a dangerous infection.).  · Your child is younger than 2 years of age and has a fever of 100.4°F (38°C) that continues for more than 1 day.  · Your child is 2 years old or older and has a fever of 100.4°F (38°C) that continues for more than 3 days.  · Your child is of any age and has repeated fevers above 104°F (40°C).  · Your child has increasing or continuing symptoms.  · Your child has vision problems (not related to ointment use).  · Your child shows signs of infection such as increased redness or swelling, worsening pain, or foul-smelling drainage from the eye.  Call 911  Call local emergency services right away if any of these occur:  · Your child has trouble breathing.  · Your child shows confusion.  · Your child is very drowsy or has trouble awakening.  · Your child faints or loses consciousness.  · Your child has a rapid heart rate.  · Your child has a seizure.  · Your child has a stiff neck.  Date Last Reviewed: 6/15/2015  © 6186-6714 [employer]. [address] [place] [address], [place] [address]. All rights reserved. This information is not intended as a substitute for professional medical care. Always follow your healthcare professional's instructions.        Teething  Baby teeth first appear during the first 4 to 9 months of age. The first teeth to appear are usually the two bottom front teeth. The next to appear are the upper four front teeth. By the third birthday, most children have all their baby teeth (about 20 teeth). Starting around 6 or 7 years of age, baby teeth begin to loosen and fall out. Permanent teeth grow in their place.  Symptoms  Most symptoms of teething are usually caused by the discomfort of tooth development. The classic symptoms associated with teething are drooling and putting the fingers in the mouth. While this is usually true, these may also just be signs of normal development. Common teething symptoms include:  · Drooling  · Redness around the mouth and chin  · Irritability, fussiness, crying  · Rubbing gums  · Biting, chewing  · Not wanting to eat  · Sleep problems  · Ear rubbing  · Fever  Home care  · Wipe drool away from the face often, so it does not cause a rash.  · Offer a chilled teething ring. Keep these in the refrigerator, not the freezer. They should not be too cold.  · Gently rub or massage your babys gums with a clean finger to relieve symptoms.  · Give your child a smooth, hard teething ring to bite on (firm rubber is best). You can also offer a cool, wet washcloth. Do not give your baby anything he or she can swallow, such as beads.  · Follow your healthcare providers instructions on the use of over-the-counter pain medicines such as acetaminophen for fever, fussiness, or discomfort. For infants over 6 months of age, you may use children's ibuprofen instead of acetaminophen. (Note: Aspirin should never be used in anyone under 18 years of age who is ill with a fever. It may cause severe liver  damage.)  · Do not use numbing gels or liquids (medicines containing benzocaine). They may give temporary relief, but they can cause a rare but serious and potentially life-threatening illness.  Follow-up care  Follow up with your childs healthcare provider, or as advised.  Call 911  Call emergency services right away if your child experiences any of these:  · Trouble breathing  · Inability to swallow  · Extreme drowsiness or trouble awakening  · Fainting or loss of consciousness  · Rapid heart rate  · Seizure  · Stiff neck  When to seek medical advice  Unless your child's healthcare provider advises otherwise, call the provider right away if:  · Your child is 3 months old or younger and has a fever of 100.4°F (38°C) or higher. (Get medical care right away. Fever in a young baby can be a sign of a dangerous infection.)  · Your child is younger than 2 years of age and has a fever of 100.4°F (38°C) that continues for more than 1 day.  · Your child is 2 years old or older and has a fever of 100.4°F (38°C) that continues for more than 3 days.  · Your child is of any age and has repeated fevers above 104°F (40°C).  · Your child has an earache (he or she pulls at the ear).  · Your child has neck pain or stiffness, or headache.  · Your child has a rash with fever.  · Your child has frequent diarrhea or vomiting.  · Your baby is fussy or cries and cannot be soothed.  Date Last Reviewed: 7/30/2015  © 9383-1527 [employer]. [address] [place] [address], [place] [address]. All rights reserved. This information is not intended as a substitute for professional medical care. Always follow your healthcare professional's instructions.      Joshua was seen today for otalgia, fever and eye problem.    Diagnoses and all orders for this visit:    Conjunctivitis, unspecified conjunctivitis type, unspecified laterality  -     moxifloxacin (VIGAMOX) 0.5 % ophthalmic solution; Place 1 drop into the right eye 3 (three) times  daily.    Teething infant            Follow Up Comments   Make sure that you follow up with your primary care doctor in the next 2-5 days if needed .  Return to the Urgent Care if signs or symptoms change and certainly if you have worsening symptoms go to the nearest emergency department for further evaluation.     Nano Morse MD     Resulted

## 2021-08-05 NOTE — ED PROVIDER NOTE - ATTENDING CONTRIBUTION TO CARE
I have personally performed a face to face bedside history and physical examination of this patient. I have discussed the history, examination, review of systems, assessment and plan of management with the resident. I have reviewed the electronic medical record and amended it to reflect my history, review of systems, physical exam, assessment and plan.    Brief HPI: 57 yo F with PMHx of polio with residual right lower extremity weakness, and cleft palate presenting for acute onset of left sided hip pain radiating down from her SI joint to her left foot.   Denies trauma.  Felt like left leg gave out while ambulating in her apartment.  Denies numbness, tingling, bowel or bladder changes, fever.      Vitals:   Reviewed    Exam:    GEN:  Non-toxic appearing, non-distressed, speaking full sentences, non-diaphoretic, AAOx3  HEENT:  NCAT, neck supple, EOMI, PERRLA, sclera anicteric, no conjunctival pallor or injection, no stridor, normal voice, no tonsillar exudate, uvula midline, tympanic membranes and external auditory canals normal appearing bilaterally   CV:  regular rhythm and rate, s1/s2 audible, no murmurs, rubs or gallops, peripheral pulses 2+ and symmetric  PULM:  non-labored respirations, lungs clear to auscultation bilaterally, no wheezes, crackles or rales  ABD:  non distended, non-tender, no rebound, no guarding, negative Hansen's sign, bowel sounds normal, no cvat  MSK:  no gross deformity, non-tender extremities and joints, range of motion grossly normal appearing, no extremity edema, extremities warm and well perfused   NEURO:  AAOx3, CN II-XII intact, motor 4/5 in RLE, motor 5/5 b/l upper and LLE, sensation grossly intact in extremities and trunk, finger to nose testing wnl, no nystagmus, negative Romberg, no pronator drift, no gait deficit with cane   SKIN:  warm, dry, no rash or vesicles     A/P:  57 yo F with PMHx of polio with residual right lower extremity weakness, and cleft palate presenting for acute onset of left sided hip pain radiating down from her SI joint to her left foot.   VSS.  Exam without weakness or sensory change of LLE, atraumatic.  Possible sciatica given described sx.  Low concern for spinal epidural abscess, transverse myelitis, acute cord compression, or cauda equina syndrome at this time.  The patient possesses no red flags including fever, chills, history of malignancy, history of intravenous drug use, recent spinal instrumentation, predominant nocturnal pain, history of immunosuppression, pelvic or perineal anesthesia or paresthesia, or fecal or urinary incontinence or retention.  Plan for x-ray, analgesia.  Dispo pending.

## 2021-08-05 NOTE — ED PROVIDER NOTE - CLINICAL SUMMARY MEDICAL DECISION MAKING FREE TEXT BOX
57yo F presenting for acute onset of left hip pain radiating down her entire left lower limb. Pt is still capable of bearing weight on the limb. Xrays of the left lower limb and pelvis ordered, will f/u

## 2021-08-05 NOTE — ED PROVIDER NOTE - NSFOLLOWUPINSTRUCTIONS_ED_ALL_ED_FT
Please follow up with your primary care provider or an orthopedic doctor within 1 week of today. If your symptoms worsen or you begin to develop a fever please return to the Emergency department.

## 2021-08-05 NOTE — ED PROVIDER NOTE - PATIENT PORTAL LINK FT
You can access the FollowMyHealth Patient Portal offered by [facility] by registering at the following website: [email or web address]. By joining [facility] FollowMyHealth portal, you will also be able to view your health information using other applications (apps) compatible with our system.

## 2021-08-05 NOTE — ED ADULT NURSE NOTE - OBJECTIVE STATEMENT
Pt presents to rm 27, A&Ox4, ambulatory w/ assistance at baseline, pmhx of polio, cleft palate- pt's speech is her baseline, here for evaluation of left hip pain that radiates down leg x 4days. Pt denies fall/ injury to area. Pt also endorsing intermittent lightheaded and dizziness sensation. Denies chest pain, shortness of breath, palpitations, diaphoresis, headaches, fevers, nausea, vomiting, diarrhea, or urinary symptoms at this time. Hot packs given to patient at this time for comfort. Call bell in reach, warm blanket provided, bed in lowest position, side rails up x2, MD evaluation in progress. Will continue to monitor.

## 2021-08-05 NOTE — ED ADULT TRIAGE NOTE - CHIEF COMPLAINT QUOTE
c/o nontraumatic left hip pain radiating down left leg x 4 days. Pt also c/o dizziness, describing as room is spinning since riding in car on way to ED. Hx polio, cleft palate (pt's speech is at baseline). Pt walks with braces and cane at baseline but having difficulty walking due to pain. [id number]

## 2021-08-05 NOTE — ED PROVIDER NOTE - OBJECTIVE STATEMENT
57yo F with PMHx of polio with residual right lower extremity weakness, and cleft palate presenting for acute onset of left sided hip pain radiating down from her SI joint to her left foot. Pt can ambulate at home with a right leg brace at baseline. She states that around 10:30 this morning she was walking around her house when she felt as if her left leg "gave out" and rotated inward, followed by what she describes as severe pain. She was able to bear weight on the limb following the event and had taken two ibuprofen but no improvement in her pain was noted. Pt states that this has never happened in the past. At bedside she appears clinically stable aside from her left lower extremity pain that she describes as sharp and radiating in nature. No gross fractures were apparent on inspection, and pt was able to bear weight on the limb when I assessed her at bedside.

## 2021-08-05 NOTE — ED PROVIDER NOTE - CHIEF COMPLAINT
The patient is a 56y Female complaining of  The patient is a 56y Female complaining of hip and leg pain

## 2021-08-06 ENCOUNTER — NON-APPOINTMENT (OUTPATIENT)
Age: 56
End: 2021-08-06

## 2021-08-06 NOTE — DISCUSSION/SUMMARY
[FreeTextEntry1] : ED Visit: Called multiple times, no answer. left voicemail for patient to call back.

## 2021-08-17 ENCOUNTER — RESULT REVIEW (OUTPATIENT)
Age: 56
End: 2021-08-17

## 2021-08-17 ENCOUNTER — OUTPATIENT (OUTPATIENT)
Dept: OUTPATIENT SERVICES | Facility: HOSPITAL | Age: 56
LOS: 1 days | End: 2021-08-17

## 2021-08-17 ENCOUNTER — APPOINTMENT (OUTPATIENT)
Dept: INTERNAL MEDICINE | Facility: CLINIC | Age: 56
End: 2021-08-17
Payer: MEDICAID

## 2021-08-17 VITALS — TEMPERATURE: 98 F

## 2021-08-17 VITALS
HEART RATE: 97 BPM | HEIGHT: 63 IN | BODY MASS INDEX: 19.49 KG/M2 | SYSTOLIC BLOOD PRESSURE: 100 MMHG | DIASTOLIC BLOOD PRESSURE: 60 MMHG | OXYGEN SATURATION: 99 % | WEIGHT: 110 LBS

## 2021-08-17 DIAGNOSIS — Z98.89 OTHER SPECIFIED POSTPROCEDURAL STATES: Chronic | ICD-10-CM

## 2021-08-17 DIAGNOSIS — E55.9 VITAMIN D DEFICIENCY, UNSPECIFIED: ICD-10-CM

## 2021-08-17 DIAGNOSIS — R05 COUGH: ICD-10-CM

## 2021-08-17 LAB
A1C WITH ESTIMATED AVERAGE GLUCOSE RESULT: 5.5 % — SIGNIFICANT CHANGE UP (ref 4–5.6)
ALBUMIN SERPL ELPH-MCNC: 4.6 G/DL — SIGNIFICANT CHANGE UP (ref 3.3–5)
ALP SERPL-CCNC: 48 U/L — SIGNIFICANT CHANGE UP (ref 40–120)
ALT FLD-CCNC: 24 U/L — SIGNIFICANT CHANGE UP (ref 4–33)
ANION GAP SERPL CALC-SCNC: 12 MMOL/L — SIGNIFICANT CHANGE UP (ref 7–14)
AST SERPL-CCNC: 32 U/L — SIGNIFICANT CHANGE UP (ref 4–32)
BASOPHILS # BLD AUTO: 0.04 K/UL — SIGNIFICANT CHANGE UP (ref 0–0.2)
BASOPHILS NFR BLD AUTO: 0.6 % — SIGNIFICANT CHANGE UP (ref 0–2)
BILIRUB SERPL-MCNC: 0.2 MG/DL — SIGNIFICANT CHANGE UP (ref 0.2–1.2)
BUN SERPL-MCNC: 13 MG/DL — SIGNIFICANT CHANGE UP (ref 7–23)
CALCIUM SERPL-MCNC: 10.1 MG/DL — SIGNIFICANT CHANGE UP (ref 8.4–10.5)
CHLORIDE SERPL-SCNC: 104 MMOL/L — SIGNIFICANT CHANGE UP (ref 98–107)
CHOLEST SERPL-MCNC: 180 MG/DL — SIGNIFICANT CHANGE UP
CO2 SERPL-SCNC: 24 MMOL/L — SIGNIFICANT CHANGE UP (ref 22–31)
CREAT SERPL-MCNC: 0.51 MG/DL — SIGNIFICANT CHANGE UP (ref 0.5–1.3)
EOSINOPHIL # BLD AUTO: 0.31 K/UL — SIGNIFICANT CHANGE UP (ref 0–0.5)
EOSINOPHIL NFR BLD AUTO: 5 % — SIGNIFICANT CHANGE UP (ref 0–6)
ESTIMATED AVERAGE GLUCOSE: 111 — SIGNIFICANT CHANGE UP
GLUCOSE SERPL-MCNC: 120 MG/DL — HIGH (ref 70–99)
HCT VFR BLD CALC: 38.5 % — SIGNIFICANT CHANGE UP (ref 34.5–45)
HDLC SERPL-MCNC: 51 MG/DL — SIGNIFICANT CHANGE UP
HGB BLD-MCNC: 12.2 G/DL — SIGNIFICANT CHANGE UP (ref 11.5–15.5)
IANC: 2.97 K/UL — SIGNIFICANT CHANGE UP (ref 1.5–8.5)
IMM GRANULOCYTES NFR BLD AUTO: 0.3 % — SIGNIFICANT CHANGE UP (ref 0–1.5)
LIPID PNL WITH DIRECT LDL SERPL: 108 MG/DL — HIGH
LYMPHOCYTES # BLD AUTO: 2.6 K/UL — SIGNIFICANT CHANGE UP (ref 1–3.3)
LYMPHOCYTES # BLD AUTO: 41.7 % — SIGNIFICANT CHANGE UP (ref 13–44)
MCHC RBC-ENTMCNC: 29.1 PG — SIGNIFICANT CHANGE UP (ref 27–34)
MCHC RBC-ENTMCNC: 31.7 GM/DL — LOW (ref 32–36)
MCV RBC AUTO: 91.9 FL — SIGNIFICANT CHANGE UP (ref 80–100)
MONOCYTES # BLD AUTO: 0.3 K/UL — SIGNIFICANT CHANGE UP (ref 0–0.9)
MONOCYTES NFR BLD AUTO: 4.8 % — SIGNIFICANT CHANGE UP (ref 2–14)
NEUTROPHILS # BLD AUTO: 2.97 K/UL — SIGNIFICANT CHANGE UP (ref 1.8–7.4)
NEUTROPHILS NFR BLD AUTO: 47.6 % — SIGNIFICANT CHANGE UP (ref 43–77)
NON HDL CHOLESTEROL: 129 MG/DL — SIGNIFICANT CHANGE UP
NRBC # BLD: 0 /100 WBCS — SIGNIFICANT CHANGE UP
NRBC # FLD: 0 K/UL — SIGNIFICANT CHANGE UP
PLATELET # BLD AUTO: 273 K/UL — SIGNIFICANT CHANGE UP (ref 150–400)
POTASSIUM SERPL-MCNC: 4 MMOL/L — SIGNIFICANT CHANGE UP (ref 3.5–5.3)
POTASSIUM SERPL-SCNC: 4 MMOL/L — SIGNIFICANT CHANGE UP (ref 3.5–5.3)
PROT SERPL-MCNC: 7.5 G/DL — SIGNIFICANT CHANGE UP (ref 6–8.3)
RBC # BLD: 4.19 M/UL — SIGNIFICANT CHANGE UP (ref 3.8–5.2)
RBC # FLD: 12.2 % — SIGNIFICANT CHANGE UP (ref 10.3–14.5)
SODIUM SERPL-SCNC: 140 MMOL/L — SIGNIFICANT CHANGE UP (ref 135–145)
TRIGL SERPL-MCNC: 107 MG/DL — SIGNIFICANT CHANGE UP
WBC # BLD: 6.24 K/UL — SIGNIFICANT CHANGE UP (ref 3.8–10.5)
WBC # FLD AUTO: 6.24 K/UL — SIGNIFICANT CHANGE UP (ref 3.8–10.5)

## 2021-08-17 PROCEDURE — 99214 OFFICE O/P EST MOD 30 MIN: CPT | Mod: GC

## 2021-08-17 RX ORDER — SERTRALINE 25 MG/1
25 TABLET, FILM COATED ORAL DAILY
Qty: 30 | Refills: 3 | Status: DISCONTINUED | COMMUNITY
Start: 2020-10-22 | End: 2021-08-17

## 2021-08-17 NOTE — HISTORY OF PRESENT ILLNESS
[FreeTextEntry1] : Back pain [de-identified] : 55 year old female w/ pmh of congenital cleft palate (bulbar weakness), lef pes equinus and post polio syndrome (right leg brace, functional paraplegia, wheel chair bound) who presents for follow up for lower back pain.\par \par Pt reports 3 weeks of waxing and waning progressively worsening left lower back pain which radiates down the LLE to her left knee. She denies any recent trauma or falls, but does note her RLE brace broke recently and feels she may be using her left side more to compensate. She denies any acute numbness or weakness associated with this pain. She was seen in the [facility] ER on  for this pain and has xrays of lumbar spine showing mild dextroscoliosis and lower lumbar spondylosis as well as xrays of left hip/femur/knee/ankle which showed no fractures or dislocations. She has been taking tylenol 1000mg BID with mild relief of symptoms. She was also taking ibuprofen 400mg q4h for 1 week prior to her ER visit but was advised to discontinue this during her ER visit. She has not participated in PT recently as the PT center she was given was too far from her home. Pt also reports some mild b/l neck/trapezius stiffness and pain, as well as mild L-sided headache today. She denies any fevers/chills, vision changes, neck swelling, throat pain, chest pain, SOB, palpitations, abd pain, n/v/d, urinary sxs, LE swelling, or rashes.\par \par Pt does also note she feels "foggy" after taking sertraline, but denies any other acute complaints. She denies any depressive symptoms, SI/HI currently. She states her depressed mood is typically situational related to frustration related to her pain, decreased functional capacity, and/or thoughts of her  son/. However, recently she says her mood has been better.

## 2021-08-17 NOTE — ASSESSMENT
[FreeTextEntry1] : 55 year old female w/ pmh of congenital cleft palate (bulbar weakness), pes equinus and post polio syndrome (right leg brace, functional paraplegia, wheel chair bound) who presents for follow up for lower back pain.\par \par #Back pain - most likely MSK related with tenderness over SI joint, worsened with movement/palpation, no acute neurologic deficits or signs to suggest nerve impingement, recent negative xrays for acute fracture or dislocation\par - Advised to increase tylenol to 1000mg TID prn\par - Will start naproxen 500mg BID prn for pain uncontrolled by tylenol, advised to try to take only 1 pill daily as needed to minimize NSAID related side effects\par - Will start flexeril 5mg TID prn for severe pain, advised to begin with only 1 pill at bedtime due to sedative side effect\par - [facility] referral\par - Ortho referral for further evaluation, will also require ortho f/u for evaluation and obtaining new RLE brace\par \par #Depression - PHQ-2 0 today, pt reports mood is good, however feels "foggy" after taking sertraline\par - Given current well-controlled mood symptoms, report of situational mood changes, and side effect with current medication, advised patient to hold sertraline at this time\par - Advised pt and daughter to call clinic back to discuss if mood changes or worsens off sertraline\par - Will repeat depression screening and continue to evaluate mood symptoms at next clinic visit or sooner if needed\par \par #Chronic cough/allergic rhinitis - currently well-controlled\par - c/w flonase and symbicort\par \par #Polio\par - Ortho evaluation as above for RLE brace replacement\par - [facility] as above\par - c/w thickened dysphagia diet\par \par #HCM\par - UTD on flu (2020), Td and PPSV23 (01/2017), COVID vaccine (03/2021)\par - Shingrix Rx'ed to patient's pharmacy\par - Pap negative 02/2020\par - Mammo birads 05/2021, next due 1 year\par - Colonoscopy referral given today\par \par RTC in 3 months for follow-up.\par \par Case d/w Dr. Pilapil\par \par Carlos Otto\par EM/IM PGY4

## 2021-08-17 NOTE — PHYSICAL EXAM
[No Acute Distress] : no acute distress [Well Nourished] : well nourished [Well Developed] : well developed [Well-Appearing] : well-appearing [Normal Sclera/Conjunctiva] : normal sclera/conjunctiva [PERRL] : pupils equal round and reactive to light [EOMI] : extraocular movements intact [Normal Outer Ear/Nose] : the outer ears and nose were normal in appearance [No Lymphadenopathy] : no lymphadenopathy [Supple] : supple [No Respiratory Distress] : no respiratory distress  [No Accessory Muscle Use] : no accessory muscle use [Clear to Auscultation] : lungs were clear to auscultation bilaterally [Normal Rate] : normal rate  [Normal S1, S2] : normal S1 and S2 [Regular Rhythm] : with a regular rhythm [No Murmur] : no murmur heard [No Edema] : there was no peripheral edema [Soft] : abdomen soft [Non Tender] : non-tender [Non-distended] : non-distended [No Masses] : no abdominal mass palpated [No Joint Swelling] : no joint swelling [No Rash] : no rash [Coordination Grossly Intact] : coordination grossly intact [Normal Affect] : the affect was normal [Normal Insight/Judgement] : insight and judgment were intact [de-identified] : Left lower lumbar paraspinal tenderness and tenderness over left SI joint, no midline spinal tenderness [de-identified] : 3/5 strength L hip flexor/knee extension, 1/5 strength R hip flexor, 2/5 strength R knee extension (all baseline per patient), 5/5 strength b/l UE [de-identified] : See MSK exam

## 2021-08-17 NOTE — REVIEW OF SYSTEMS
[Joint Pain] : joint pain [Joint Stiffness] : joint stiffness [Back Pain] : back pain [Headache] : headache [Fever] : no fever [Chills] : no chills [Fatigue] : no fatigue [Pain] : no pain [Vision Problems] : no vision problems [Earache] : no earache [Sore Throat] : no sore throat [Chest Pain] : no chest pain [Palpitations] : no palpitations [Lower Ext Edema] : no lower extremity edema [Shortness Of Breath] : no shortness of breath [Wheezing] : no wheezing [Cough] : no cough [Abdominal Pain] : no abdominal pain [Nausea] : no nausea [Diarrhea] : diarrhea [Vomiting] : no vomiting [Dysuria] : no dysuria [Hematuria] : no hematuria [Joint Swelling] : no joint swelling [Skin Rash] : no skin rash [Dizziness] : no dizziness [Fainting] : no fainting [Confusion] : no confusion [Suicidal] : not suicidal [Anxiety] : no anxiety [Depression] : no depression [Easy Bleeding] : no easy bleeding [Easy Bruising] : no easy bruising

## 2021-08-18 LAB — 24R-OH-CALCIDIOL SERPL-MCNC: 52.9 NG/ML — SIGNIFICANT CHANGE UP (ref 30–80)

## 2021-08-19 DIAGNOSIS — F32.9 MAJOR DEPRESSIVE DISORDER, SINGLE EPISODE, UNSPECIFIED: ICD-10-CM

## 2021-08-19 DIAGNOSIS — M54.16 RADICULOPATHY, LUMBAR REGION: ICD-10-CM

## 2021-08-19 DIAGNOSIS — R05 COUGH: ICD-10-CM

## 2021-08-19 DIAGNOSIS — J30.2 OTHER SEASONAL ALLERGIC RHINITIS: ICD-10-CM

## 2021-08-19 DIAGNOSIS — E55.9 VITAMIN D DEFICIENCY, UNSPECIFIED: ICD-10-CM

## 2021-08-25 ENCOUNTER — APPOINTMENT (OUTPATIENT)
Dept: ORTHOPEDIC SURGERY | Facility: HOSPITAL | Age: 56
End: 2021-08-25

## 2021-08-25 ENCOUNTER — OUTPATIENT (OUTPATIENT)
Dept: OUTPATIENT SERVICES | Facility: HOSPITAL | Age: 56
LOS: 1 days | End: 2021-08-25

## 2021-08-25 VITALS
WEIGHT: 110 LBS | TEMPERATURE: 97.6 F | DIASTOLIC BLOOD PRESSURE: 70 MMHG | BODY MASS INDEX: 19.49 KG/M2 | HEART RATE: 74 BPM | SYSTOLIC BLOOD PRESSURE: 105 MMHG | HEIGHT: 63 IN

## 2021-08-25 DIAGNOSIS — Z98.89 OTHER SPECIFIED POSTPROCEDURAL STATES: Chronic | ICD-10-CM

## 2021-08-26 DIAGNOSIS — M54.16 RADICULOPATHY, LUMBAR REGION: ICD-10-CM

## 2021-12-13 ENCOUNTER — MED ADMIN CHARGE (OUTPATIENT)
Age: 56
End: 2021-12-13

## 2021-12-13 ENCOUNTER — OUTPATIENT (OUTPATIENT)
Dept: OUTPATIENT SERVICES | Facility: HOSPITAL | Age: 56
LOS: 1 days | End: 2021-12-13

## 2021-12-13 ENCOUNTER — APPOINTMENT (OUTPATIENT)
Dept: INTERNAL MEDICINE | Facility: CLINIC | Age: 56
End: 2021-12-13
Payer: MEDICAID

## 2021-12-13 DIAGNOSIS — A80.9 ACUTE POLIOMYELITIS, UNSPECIFIED: ICD-10-CM

## 2021-12-13 DIAGNOSIS — M25.50 PAIN IN UNSPECIFIED JOINT: ICD-10-CM

## 2021-12-13 DIAGNOSIS — Z98.89 OTHER SPECIFIED POSTPROCEDURAL STATES: Chronic | ICD-10-CM

## 2021-12-13 DIAGNOSIS — F32.A DEPRESSION, UNSPECIFIED: ICD-10-CM

## 2021-12-13 DIAGNOSIS — H93.8X9 OTHER SPECIFIED DISORDERS OF EAR, UNSPECIFIED EAR: ICD-10-CM

## 2021-12-13 PROCEDURE — 99214 OFFICE O/P EST MOD 30 MIN: CPT | Mod: GC

## 2021-12-14 ENCOUNTER — MED ADMIN CHARGE (OUTPATIENT)
Age: 56
End: 2021-12-14

## 2021-12-30 NOTE — ADDENDUM
[FreeTextEntry1] : Total time spent on encounter: 45 minutes \par \par \par RTC in 6 months\par \par \par Deb Min\par PGY-3 | [employer] [facility]

## 2021-12-30 NOTE — PHYSICAL EXAM
[No Acute Distress] : no acute distress [Normal Sclera/Conjunctiva] : normal sclera/conjunctiva [Normal Outer Ear/Nose] : the outer ears and nose were normal in appearance [No Lymphadenopathy] : no lymphadenopathy [Supple] : supple [Clear to Auscultation] : lungs were clear to auscultation bilaterally [Normal] : soft, non-tender, non-distended, no masses palpated, no HSM and normal bowel sounds [No Joint Swelling] : no joint swelling [Normal Mood] : the mood was normal [de-identified] : +tympanic membrane w/ mild white exudate, no discharge  [de-identified] : +wheelchair bound

## 2021-12-30 NOTE — REVIEW OF SYSTEMS
[Joint Pain] : joint pain [Joint Stiffness] : joint stiffness [Muscle Pain] : muscle pain [Back Pain] : back pain [Unsteady Walking] : ataxia [Fever] : no fever [Chills] : no chills [Vision Problems] : no vision problems [Chest Pain] : no chest pain [Palpitations] : no palpitations [Lower Ext Edema] : no lower extremity edema [Orthopnea] : no orthopnea [Shortness Of Breath] : no shortness of breath [Wheezing] : no wheezing [Cough] : no cough [Abdominal Pain] : no abdominal pain [Nausea] : no nausea [Constipation] : no constipation [Diarrhea] : diarrhea [Vomiting] : no vomiting [Skin Rash] : no skin rash [Memory Loss] : no memory loss [Insomnia] : no insomnia [Anxiety] : no anxiety [Depression] : no depression [FreeTextEntry4] : +ear fullness

## 2021-12-30 NOTE — HISTORY OF PRESENT ILLNESS
[FreeTextEntry1] : Back pain and ear fullness  [de-identified] : 55 year old female w/ pmh of congenital cleft palate (bulbar weakness), lef pes equinus and post polio syndrome (right leg brace, functional paraplegia, wheel chair bound) who presents for follow up for lower back pain. Recently evaluated in clinic 08/21 for acute on chronic intermittent lower back pain. Prior to clinic pt visited ED and obtained xrays of the lumbar spine which showed  mild dextroscoliosis and lower lumbar spondylosis, x-rays of left hip/femur/knee/ankle  showed no fractures or dislocations. At apt pt started on Flexeril 5mg TID. Tylenol increased from 1000mg BID to TID. Also started on naproxen 500mg BID for pain uncontrolled with Tylenol. \par Pt at this time states back pain is mildly improved. Pt has however been unable to obtain PT given insurance issues.\par \par Pt at this time also complaining of B/L ear "fullness/pressure". States symptom started 3 months ago. States she feels pressure around jaw region as well and has to bite down/clench her mouth to get rid of sensation. Not associated or exacerbated by eating. States she intermittently puts oil into her ears in order to clean them out but has not done that in awhile. Denies recent sinusitis, runny nose, sore throat, itchy eyes or eye discharge, throat swelling. Denies hearing loss or ear discharge. \par \par Regarding mood, pt states she no longer takes sertraline as it makes her sleepy. States mood is "okay." Expresses frustration and sadness regarding uncontrolled pain but denies sleep difficulty, appetite or energy changes or suicidal ideation.\par  \par

## 2022-01-26 ENCOUNTER — NON-APPOINTMENT (OUTPATIENT)
Age: 57
End: 2022-01-26

## 2022-03-14 ENCOUNTER — APPOINTMENT (OUTPATIENT)
Dept: INTERNAL MEDICINE | Facility: CLINIC | Age: 57
End: 2022-03-14
Payer: MEDICAID

## 2022-03-14 ENCOUNTER — OUTPATIENT (OUTPATIENT)
Dept: OUTPATIENT SERVICES | Facility: HOSPITAL | Age: 57
LOS: 1 days | End: 2022-03-14

## 2022-03-14 VITALS — WEIGHT: 110 LBS | BODY MASS INDEX: 19.49 KG/M2 | HEIGHT: 63 IN

## 2022-03-14 DIAGNOSIS — Z98.89 OTHER SPECIFIED POSTPROCEDURAL STATES: Chronic | ICD-10-CM

## 2022-03-14 DIAGNOSIS — M21.6X9 OTHER ACQUIRED DEFORMITIES OF UNSPECIFIED FOOT: ICD-10-CM

## 2022-03-14 PROCEDURE — 99214 OFFICE O/P EST MOD 30 MIN: CPT | Mod: GC,95

## 2022-03-15 ENCOUNTER — NON-APPOINTMENT (OUTPATIENT)
Age: 57
End: 2022-03-15

## 2022-03-15 DIAGNOSIS — G14 POSTPOLIO SYNDROME: ICD-10-CM

## 2022-03-15 DIAGNOSIS — M21.6X9 OTHER ACQUIRED DEFORMITIES OF UNSPECIFIED FOOT: ICD-10-CM

## 2022-03-15 NOTE — ASSESSMENT
[FreeTextEntry1] : #HCM\par Colonoscopy: to be addressed at next visit\par Mammo: 2021\par Pap: 2020\par \par Viri Spencer, [employer] [facility]\par Has upcoming in person visit next week for HCM \par \par

## 2022-03-15 NOTE — HISTORY OF PRESENT ILLNESS
[Home] : at home, [unfilled] , at the time of the visit. [Family Member] : family member [Verbal consent obtained from patient] : the patient, [unfilled] [Medical Office: ([facility])___] : at the medical office located in  [FreeTextEntry1] : home health care forms\par \par  [de-identified] : 55 Y/O F with congenital cleft palate (bulbar weakness), left pes equinus and post polio syndrome (right leg brace, functional paraplegia, wheel chair bound) who presents for telephonic visit for completion of home health care forms. Daughter-in-law helped with communication as it was difficult to understand patient over the phone at times. Pt still has intermittent lower back pain for which she was started recently on naproxen and it reportedly has been alleviating the pain. \par \par Reports that she needs help with all ADL's including cooking, dressing, using bathroom. Is on thickened dysphagia diet which she is tolerating well. Is wheelchair bound but able to ambulate on her own with the wheelchair. Has 4 stairs to get to her home and family attends to this. Attends activities at the senior center. \par \par Reports that her mood is ok without medications. Denies SI/HI. Reports that her daughter-in-law is taking care of her now; feels safe at home.

## 2022-03-15 NOTE — REVIEW OF SYSTEMS
[Fever] : no fever [Chills] : no chills [Fatigue] : no fatigue [Chest Pain] : no chest pain [Palpitations] : no palpitations [Lower Ext Edema] : no lower extremity edema [Shortness Of Breath] : no shortness of breath [Wheezing] : no wheezing [Cough] : no cough [Abdominal Pain] : no abdominal pain [Nausea] : no nausea [Dysuria] : no dysuria [Headache] : no headache [Suicidal] : not suicidal [FreeTextEntry9] : + chronic back pain

## 2022-03-30 ENCOUNTER — APPOINTMENT (OUTPATIENT)
Dept: INTERNAL MEDICINE | Facility: CLINIC | Age: 57
End: 2022-03-30

## 2022-08-29 ENCOUNTER — RX RENEWAL (OUTPATIENT)
Age: 57
End: 2022-08-29

## 2022-09-20 ENCOUNTER — APPOINTMENT (OUTPATIENT)
Dept: INTERNAL MEDICINE | Facility: CLINIC | Age: 57
End: 2022-09-20

## 2022-09-20 ENCOUNTER — OUTPATIENT (OUTPATIENT)
Dept: OUTPATIENT SERVICES | Facility: HOSPITAL | Age: 57
LOS: 1 days | End: 2022-09-20

## 2022-09-20 VITALS
SYSTOLIC BLOOD PRESSURE: 100 MMHG | OXYGEN SATURATION: 99 % | HEART RATE: 79 BPM | HEIGHT: 63 IN | WEIGHT: 110 LBS | DIASTOLIC BLOOD PRESSURE: 70 MMHG | BODY MASS INDEX: 19.49 KG/M2

## 2022-09-20 DIAGNOSIS — Z98.89 OTHER SPECIFIED POSTPROCEDURAL STATES: Chronic | ICD-10-CM

## 2022-09-20 DIAGNOSIS — M25.50 PAIN IN UNSPECIFIED JOINT: ICD-10-CM

## 2022-09-20 PROCEDURE — 99396 PREV VISIT EST AGE 40-64: CPT | Mod: GC

## 2022-09-20 NOTE — PLAN
[FreeTextEntry1] : Will follow up with telehealth visit to clarify medications in 5 weeks.\par \par Case discussed with Dr. Dawkins \par \par Mini Larose PGY1 \par [employer] [facility]

## 2022-09-20 NOTE — PHYSICAL EXAM
[No Acute Distress] : no acute distress [Well-Appearing] : well-appearing [PERRL] : pupils equal round and reactive to light [EOMI] : extraocular movements intact [Normal Outer Ear/Nose] : the outer ears and nose were normal in appearance [No JVD] : no jugular venous distention [No Lymphadenopathy] : no lymphadenopathy [Clear to Auscultation] : lungs were clear to auscultation bilaterally [Normal Rate] : normal rate  [Regular Rhythm] : with a regular rhythm [Soft] : abdomen soft [Non Tender] : non-tender [Normal Bowel Sounds] : normal bowel sounds [No Rash] : no rash [Coordination Grossly Intact] : coordination grossly intact [de-identified] : Patient sitting comfortably in wheelchair  [de-identified] : U [de-identified] : Distal pulses palpable  [de-identified] : Strength 5/5 UE, 2/5 LE, rheumatoid nodule in right 5th PIP, no tenderness or warm joints  [de-identified] : hyperpigmentation in joints  [de-identified] : S

## 2022-09-20 NOTE — HISTORY OF PRESENT ILLNESS
[FreeTextEntry1] : CPE  [de-identified] : 57 Y/O F with congenital cleft palate (bulbar weakness), left pes equinus and post polio syndrome (right leg brace, functional paraplegia, wheel chair bound) presents for a physical exam. Ambulating with wheelchair from post polio. She has been taking tylenol, flexaril and ibuprofen for chronic back pain and radiculopathy pain management. Had seen orthopedist who recommended physical therapy but patient reports that she was unable to receive these services. Was able to get home health from last visit and aid has been helping her around the house. Reports that she has not had depressive thoughts or loss of interest, denies SI off of antidepressant. \par \par Patient reports that she was diagnosed with rheumatoid arthritis and follows with a rheumatologist outside the system. Was unable to understand her fully with cleft palate and speech impediment. She reports that all of her medications are in the system but not on any DMARDs according to record. Will have to talk to daughter, as she was busy today.

## 2022-09-23 LAB
ALBUMIN SERPL ELPH-MCNC: 4.6 G/DL
ALP BLD-CCNC: 48 U/L
ALT SERPL-CCNC: 22 U/L
ANION GAP SERPL CALC-SCNC: 11 MMOL/L
AST SERPL-CCNC: 26 U/L
BASOPHILS # BLD AUTO: 0.03 K/UL
BASOPHILS NFR BLD AUTO: 0.5 %
BILIRUB SERPL-MCNC: 0.3 MG/DL
BUN SERPL-MCNC: 15 MG/DL
CALCIUM SERPL-MCNC: 9.6 MG/DL
CCP AB SER IA-ACNC: <8 UNITS
CHLORIDE SERPL-SCNC: 108 MMOL/L
CHOLEST SERPL-MCNC: 192 MG/DL
CO2 SERPL-SCNC: 24 MMOL/L
CREAT SERPL-MCNC: 0.5 MG/DL
CRP SERPL-MCNC: <3 MG/L
EGFR: 109 ML/MIN/1.73M2
EOSINOPHIL # BLD AUTO: 0.18 K/UL
EOSINOPHIL NFR BLD AUTO: 2.9 %
ERYTHROCYTE [SEDIMENTATION RATE] IN BLOOD BY WESTERGREN METHOD: 10 MM/HR
ESTIMATED AVERAGE GLUCOSE: 114 MG/DL
GLUCOSE SERPL-MCNC: 129 MG/DL
HBA1C MFR BLD HPLC: 5.6 %
HCT VFR BLD CALC: 38.8 %
HDLC SERPL-MCNC: 58 MG/DL
HGB BLD-MCNC: 12.3 G/DL
IMM GRANULOCYTES NFR BLD AUTO: 0.5 %
LDLC SERPL CALC-MCNC: 114 MG/DL
LYMPHOCYTES # BLD AUTO: 2.29 K/UL
LYMPHOCYTES NFR BLD AUTO: 37.1 %
MAN DIFF?: NORMAL
MCHC RBC-ENTMCNC: 29.2 PG
MCHC RBC-ENTMCNC: 31.7 GM/DL
MCV RBC AUTO: 92.2 FL
MONOCYTES # BLD AUTO: 0.41 K/UL
MONOCYTES NFR BLD AUTO: 6.6 %
NEUTROPHILS # BLD AUTO: 3.23 K/UL
NEUTROPHILS NFR BLD AUTO: 52.4 %
NONHDLC SERPL-MCNC: 133 MG/DL
PLATELET # BLD AUTO: 257 K/UL
POTASSIUM SERPL-SCNC: 4.3 MMOL/L
PROT SERPL-MCNC: 7.1 G/DL
RBC # BLD: 4.21 M/UL
RBC # FLD: 12.8 %
RF+CCP IGG SER-IMP: NEGATIVE
RHEUMATOID FACT SER QL: <10 IU/ML
SODIUM SERPL-SCNC: 143 MMOL/L
TRIGL SERPL-MCNC: 100 MG/DL
WBC # FLD AUTO: 6.17 K/UL

## 2022-09-26 DIAGNOSIS — Z23 ENCOUNTER FOR IMMUNIZATION: ICD-10-CM

## 2022-09-26 DIAGNOSIS — M54.16 RADICULOPATHY, LUMBAR REGION: ICD-10-CM

## 2022-09-26 DIAGNOSIS — M06.9 RHEUMATOID ARTHRITIS, UNSPECIFIED: ICD-10-CM

## 2022-09-26 DIAGNOSIS — F32.A DEPRESSION, UNSPECIFIED: ICD-10-CM

## 2022-09-26 DIAGNOSIS — Z00.00 ENCOUNTER FOR GENERAL ADULT MEDICAL EXAMINATION WITHOUT ABNORMAL FINDINGS: ICD-10-CM

## 2022-09-26 DIAGNOSIS — M25.50 PAIN IN UNSPECIFIED JOINT: ICD-10-CM

## 2022-09-26 DIAGNOSIS — A80.9 ACUTE POLIOMYELITIS, UNSPECIFIED: ICD-10-CM

## 2022-10-26 ENCOUNTER — APPOINTMENT (OUTPATIENT)
Dept: INTERNAL MEDICINE | Facility: CLINIC | Age: 57
End: 2022-10-26

## 2022-10-26 ENCOUNTER — OUTPATIENT (OUTPATIENT)
Dept: OUTPATIENT SERVICES | Facility: HOSPITAL | Age: 57
LOS: 1 days | End: 2022-10-26

## 2022-10-26 VITALS
DIASTOLIC BLOOD PRESSURE: 70 MMHG | WEIGHT: 110 LBS | HEART RATE: 80 BPM | SYSTOLIC BLOOD PRESSURE: 111 MMHG | BODY MASS INDEX: 19.49 KG/M2 | HEIGHT: 63 IN

## 2022-10-26 DIAGNOSIS — Z98.89 OTHER SPECIFIED POSTPROCEDURAL STATES: Chronic | ICD-10-CM

## 2022-10-26 DIAGNOSIS — M54.16 RADICULOPATHY, LUMBAR REGION: ICD-10-CM

## 2022-10-26 DIAGNOSIS — R05.8 OTHER SPECIFIED COUGH: ICD-10-CM

## 2022-10-26 DIAGNOSIS — H93.8X9 OTHER SPECIFIED DISORDERS OF EAR, UNSPECIFIED EAR: ICD-10-CM

## 2022-10-26 DIAGNOSIS — A80.9 ACUTE POLIOMYELITIS, UNSPECIFIED: ICD-10-CM

## 2022-10-26 DIAGNOSIS — M06.9 RHEUMATOID ARTHRITIS, UNSPECIFIED: ICD-10-CM

## 2022-10-26 PROCEDURE — ZZZZZ: CPT

## 2022-10-26 RX ORDER — NAPROXEN 500 MG/1
500 TABLET ORAL
Qty: 60 | Refills: 2 | Status: DISCONTINUED | COMMUNITY
Start: 2021-08-17 | End: 2022-10-26

## 2022-11-04 DIAGNOSIS — J30.2 OTHER SEASONAL ALLERGIC RHINITIS: ICD-10-CM

## 2022-11-04 DIAGNOSIS — M19.90 UNSPECIFIED OSTEOARTHRITIS, UNSPECIFIED SITE: ICD-10-CM

## 2022-11-04 DIAGNOSIS — M54.16 RADICULOPATHY, LUMBAR REGION: ICD-10-CM

## 2022-11-04 DIAGNOSIS — G14 POSTPOLIO SYNDROME: ICD-10-CM

## 2022-11-04 DIAGNOSIS — A80.9 ACUTE POLIOMYELITIS, UNSPECIFIED: ICD-10-CM

## 2022-11-04 NOTE — HISTORY OF PRESENT ILLNESS
[Home] : at home, [unfilled] , at the time of the visit. [Medical Office: ([facility])___] : at the medical office located in  [Family Member] : family member [Verbal consent obtained from patient] : the patient, [unfilled] [Formal Caregiver] : formal caregiver [FreeTextEntry1] : Follow up, medical conditions  [de-identified] : 57 Y/O F with congenital cleft palate (bulbar weakness), left pes equinus and post polio syndrome (right leg brace, functional paraplegia, wheel chair bound) presents via telephonic for follow up of medical conditions and med rec. Aid present with patient and assisted on history. Patient reports that since last visit, she has not been taking naproxen and flexaril since she thought I told her not to take it anymore. She is only on tylenol 1000 TID and having persistent pain in the lower back and hips. Also reports that she has pain in her fingers and nodules. Reports prior diagnosis of rheumatoid arthritis but RA labs all normal from last visit, more likely osteo. \par \par Pt reports that she has been going to [employer] and working out/dancing 2 times a week. Med rec completed today. \par \par

## 2023-01-13 ENCOUNTER — OUTPATIENT (OUTPATIENT)
Dept: OUTPATIENT SERVICES | Facility: HOSPITAL | Age: 58
LOS: 1 days | End: 2023-01-13

## 2023-01-13 ENCOUNTER — APPOINTMENT (OUTPATIENT)
Dept: INTERNAL MEDICINE | Facility: CLINIC | Age: 58
End: 2023-01-13
Payer: MEDICAID

## 2023-01-13 ENCOUNTER — NON-APPOINTMENT (OUTPATIENT)
Age: 58
End: 2023-01-13

## 2023-01-13 VITALS
SYSTOLIC BLOOD PRESSURE: 110 MMHG | RESPIRATION RATE: 16 BRPM | HEART RATE: 76 BPM | HEIGHT: 63 IN | OXYGEN SATURATION: 96 % | DIASTOLIC BLOOD PRESSURE: 68 MMHG | WEIGHT: 106 LBS | BODY MASS INDEX: 18.78 KG/M2

## 2023-01-13 DIAGNOSIS — M25.522 PAIN IN RIGHT ELBOW: ICD-10-CM

## 2023-01-13 DIAGNOSIS — R14.0 ABDOMINAL DISTENSION (GASEOUS): ICD-10-CM

## 2023-01-13 DIAGNOSIS — Z87.39 PERSONAL HISTORY OF OTHER DISEASES OF THE MUSCULOSKELETAL SYSTEM AND CONNECTIVE TISSUE: ICD-10-CM

## 2023-01-13 DIAGNOSIS — Z00.00 ENCOUNTER FOR GENERAL ADULT MEDICAL EXAMINATION W/OUT ABNORMAL FINDINGS: ICD-10-CM

## 2023-01-13 DIAGNOSIS — Z98.89 OTHER SPECIFIED POSTPROCEDURAL STATES: Chronic | ICD-10-CM

## 2023-01-13 DIAGNOSIS — R13.10 DYSPHAGIA, UNSPECIFIED: ICD-10-CM

## 2023-01-13 DIAGNOSIS — Z86.59 PERSONAL HISTORY OF OTHER MENTAL AND BEHAVIORAL DISORDERS: ICD-10-CM

## 2023-01-13 DIAGNOSIS — M25.521 PAIN IN RIGHT ELBOW: ICD-10-CM

## 2023-01-13 PROCEDURE — 99214 OFFICE O/P EST MOD 30 MIN: CPT | Mod: GC

## 2023-01-13 RX ORDER — ZOSTER VACCINE RECOMBINANT, ADJUVANTED 50 MCG/0.5
50 KIT INTRAMUSCULAR
Qty: 1 | Refills: 0 | Status: DISCONTINUED | COMMUNITY
Start: 2021-08-17 | End: 2023-01-13

## 2023-01-13 RX ORDER — CYCLOBENZAPRINE HYDROCHLORIDE 5 MG/1
5 TABLET, FILM COATED ORAL
Qty: 30 | Refills: 0 | Status: DISCONTINUED | COMMUNITY
Start: 2021-08-17 | End: 2023-01-13

## 2023-01-15 NOTE — HEALTH RISK ASSESSMENT
[Never] : Never [0-4] : 0-4 [1 or 2 (0 pts)] : 1 or 2 (0 points) [Never (0 pts)] : Never (0 points) [No] : In the past 12 months have you used drugs other than those required for medical reasons? No [0] : 2) Feeling down, depressed, or hopeless: Not at all (0) [PHQ-2 Negative - No further assessment needed] : PHQ-2 Negative - No further assessment needed [Audit-CScore] : 0 [[id number]] : 0

## 2023-01-15 NOTE — HISTORY OF PRESENT ILLNESS
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 At the time of the patient's most recent visit, she was initiated on duloxetine for management of suspected neuropathic pain. However, she noted significant difficulty falling asleep at night with difficulty rising from the bed in the morning, so she decided to discontinue her medication. Her pain in her joints of her hands and of her back is well controlled with oral acetaminophen administered twice daily. \par \par The patient denies any symptoms of depression or of anhedonia at this time. She has noted symptoms of bloating for the past several days that are made worse with administration of hot or spicy foods. The patient has never tested positive for h. pylori.

## 2023-01-15 NOTE — PHYSICAL EXAM
[Normal] : affect was normal and insight and judgment were intact [de-identified] : Resting in chair comfortably; able to speak comfortably although dysarthria noted  [de-identified] : Swollen distal and proximal interphalangeal joints noted; swollen elbows noted without any evident surrounding erythema or induration

## 2023-01-15 NOTE — ASSESSMENT
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 Need for completion of home care forms: -In the setting of patient's previous infection with polio, she has notable motor neuropathy and skeletal muscle atrophy and weakness; she also suffers from associated dysarthria, dysphagia (requiring a modified diet with thickened liquids), and degenerative joint disease \par -While the patient can dress, use the restroom, and eat on her own, she requires assistance from her family or from her home health aides for instrumental activities of daily living (such as cleaning, preparing meals, doing laundry) \par -Home care forms completed today in clinic and signed by attending physician; forms copied with original forms returned to patient; photocopies to be faxed to number listed at the top of the form \par \par 2. Post-polio syndrome: - Patient suffered from infection with polio many years ago and now suffers from motor neuropathy and skeletal myopathy with associated dysarthria and dysphagia and degenerative joint disease \par -Patient manages discomfort associated with post-polio syndrome with acetaminophen twice daily \par -Participates in day program and desires referral to physical therapy--provided today \par \par 3. Dyspnea: -Patient describes chronic dyspnea that limits mobility \par -Patient receives nasal corticosteroids (possible seasonal allergies and/or atopy) and patient is treated with inhaled budesonide/formoterol for possible asthma vs. other obstructive lung disease although spirometry demonstrates fev1/fvc elevated to slightly greater than 70% with only minimal bronchodilatory response\par -Most likely that patient's dyspnea is a result neuromuscular debility and less likely marycarmen obstructive lung disease; will continue therapy with inhaled laba/ics combination and with physical therapy \par \par 4. Healthcare maintenance: -Colonoscopy performed in 2017 at another facility and results normal as per patient; ideally, patient would bring records to us; likely to be repeated ten years from initial exam \par -Mammogram birads 1 in 2021; to be repeated this year \par -Pap/hpv co testing negative in 2020; to be repeated in 2025 \par -HCV and HIV negative \par -Flu and covid vaccines obtained (encouraged bivalent booster be obtained as soon as possible); tdap up to date; pcv20 provided today; shingrix encouraged; will discuss hep b at time of next visit \par \par Discussed with Dr. Dawkins

## 2023-01-19 DIAGNOSIS — M25.521 PAIN IN RIGHT ELBOW: ICD-10-CM

## 2023-01-19 DIAGNOSIS — Z00.00 ENCOUNTER FOR GENERAL ADULT MEDICAL EXAMINATION WITHOUT ABNORMAL FINDINGS: ICD-10-CM

## 2023-01-19 DIAGNOSIS — Z23 ENCOUNTER FOR IMMUNIZATION: ICD-10-CM

## 2023-01-19 DIAGNOSIS — R13.10 DYSPHAGIA, UNSPECIFIED: ICD-10-CM

## 2023-01-19 DIAGNOSIS — G14 POSTPOLIO SYNDROME: ICD-10-CM

## 2023-01-19 DIAGNOSIS — R14.0 ABDOMINAL DISTENSION (GASEOUS): ICD-10-CM

## 2023-01-19 DIAGNOSIS — M19.90 UNSPECIFIED OSTEOARTHRITIS, UNSPECIFIED SITE: ICD-10-CM

## 2023-02-27 ENCOUNTER — NON-APPOINTMENT (OUTPATIENT)
Age: 58
End: 2023-02-27

## 2023-05-22 ENCOUNTER — APPOINTMENT (OUTPATIENT)
Dept: INTERNAL MEDICINE | Facility: CLINIC | Age: 58
End: 2023-05-22
Payer: MEDICAID

## 2023-05-22 ENCOUNTER — OUTPATIENT (OUTPATIENT)
Dept: OUTPATIENT SERVICES | Facility: HOSPITAL | Age: 58
LOS: 1 days | End: 2023-05-22

## 2023-05-22 VITALS
HEIGHT: 63 IN | BODY MASS INDEX: 18.07 KG/M2 | OXYGEN SATURATION: 98 % | DIASTOLIC BLOOD PRESSURE: 60 MMHG | WEIGHT: 102 LBS | HEART RATE: 83 BPM | SYSTOLIC BLOOD PRESSURE: 90 MMHG

## 2023-05-22 DIAGNOSIS — Z98.89 OTHER SPECIFIED POSTPROCEDURAL STATES: Chronic | ICD-10-CM

## 2023-05-22 DIAGNOSIS — J30.2 OTHER SEASONAL ALLERGIC RHINITIS: ICD-10-CM

## 2023-05-22 PROCEDURE — 99213 OFFICE O/P EST LOW 20 MIN: CPT | Mod: GE

## 2023-05-25 DIAGNOSIS — Z00.00 ENCOUNTER FOR GENERAL ADULT MEDICAL EXAMINATION WITHOUT ABNORMAL FINDINGS: ICD-10-CM

## 2023-05-25 DIAGNOSIS — J30.2 OTHER SEASONAL ALLERGIC RHINITIS: ICD-10-CM

## 2023-05-25 DIAGNOSIS — G14 POSTPOLIO SYNDROME: ICD-10-CM

## 2023-05-25 DIAGNOSIS — M19.90 UNSPECIFIED OSTEOARTHRITIS, UNSPECIFIED SITE: ICD-10-CM

## 2023-05-25 NOTE — HISTORY OF PRESENT ILLNESS
[FreeTextEntry1] : Follow up  [de-identified] : 58-year-old woman who is followed for motor neuropathy, and associated skeletal myopathy (with dysphagia and dysarthria), and complicated by likely degenerative joint disease, subsequent to previous polio infection presents for follow up. Presented in January for home health care forms. Also had laser surgery (glaucoma?) and takes eye drops. \par \par Patient reports that she lives with her son and daughter, has been enjoying their company. Also is continuing with her upper body exercises. Denies depressive symptoms or anhedonia, states that she is "happy".She has been referred to physical therapy multiple times, however, reports that her insurance will not approve of it. Talked to her care coordinator Nancy on the phone, informed her of physical therapy referral and to call office if pt does not get approved. \par \par Does report that she still has leg and back pain. Only takes tylenol 2x a day and celecoxib as needed. Was previously prescribed duloxetine, but only takes once a week. Also reports that she has felt more bloated, denies reflux or sour taste in the mouth. Reports constipation at times, no diarrhea, hematochezia or melena. Denies abdominal pain.

## 2023-05-25 NOTE — PLAN
[FreeTextEntry1] : \par D/w Dr. Doran \par \par RTC in 30 weeks (6 months)\par \par Mini [facility] PGY1\par [employer] [facility]

## 2023-05-25 NOTE — PHYSICAL EXAM
[No Acute Distress] : no acute distress [Well Nourished] : well nourished [Normal Sclera/Conjunctiva] : normal sclera/conjunctiva [PERRL] : pupils equal round and reactive to light [No Respiratory Distress] : no respiratory distress  [No Accessory Muscle Use] : no accessory muscle use [Clear to Auscultation] : lungs were clear to auscultation bilaterally [Normal Rate] : normal rate  [Regular Rhythm] : with a regular rhythm [Normal S1, S2] : normal S1 and S2 [Soft] : abdomen soft [Non Tender] : non-tender [Non-distended] : non-distended [No Spinal Tenderness] : no spinal tenderness [de-identified] : No wheezing  [de-identified] : Normal bowel sounds  [de-identified] : Muscular tenderness to palpation  [de-identified] : Right lower extremity in brace, left LLE decreased quad strength

## 2023-06-12 ENCOUNTER — APPOINTMENT (OUTPATIENT)
Dept: MAMMOGRAPHY | Facility: IMAGING CENTER | Age: 58
End: 2023-06-12

## 2023-06-12 ENCOUNTER — OUTPATIENT (OUTPATIENT)
Dept: OUTPATIENT SERVICES | Facility: HOSPITAL | Age: 58
LOS: 1 days | End: 2023-06-12
Payer: MEDICAID

## 2023-06-12 DIAGNOSIS — Z00.8 ENCOUNTER FOR OTHER GENERAL EXAMINATION: ICD-10-CM

## 2023-06-12 DIAGNOSIS — Z98.89 OTHER SPECIFIED POSTPROCEDURAL STATES: Chronic | ICD-10-CM

## 2023-06-12 PROCEDURE — 77067 SCR MAMMO BI INCL CAD: CPT

## 2023-06-12 PROCEDURE — 77063 BREAST TOMOSYNTHESIS BI: CPT

## 2023-09-01 ENCOUNTER — RX RENEWAL (OUTPATIENT)
Age: 58
End: 2023-09-01

## 2023-09-01 RX ORDER — MULTIVIT-MIN/FOLIC/VIT K/LYCOP 400-300MCG
25 MCG TABLET ORAL DAILY
Qty: 30 | Refills: 0 | Status: ACTIVE | COMMUNITY
Start: 2018-08-10 | End: 1900-01-01

## 2023-11-13 ENCOUNTER — OUTPATIENT (OUTPATIENT)
Dept: OUTPATIENT SERVICES | Facility: HOSPITAL | Age: 58
LOS: 1 days | End: 2023-11-13

## 2023-11-13 ENCOUNTER — APPOINTMENT (OUTPATIENT)
Dept: INTERNAL MEDICINE | Facility: CLINIC | Age: 58
End: 2023-11-13
Payer: MEDICAID

## 2023-11-13 VITALS
OXYGEN SATURATION: 99 % | HEIGHT: 63 IN | SYSTOLIC BLOOD PRESSURE: 101 MMHG | HEART RATE: 81 BPM | BODY MASS INDEX: 17.54 KG/M2 | DIASTOLIC BLOOD PRESSURE: 65 MMHG | WEIGHT: 99 LBS

## 2023-11-13 DIAGNOSIS — M19.90 UNSPECIFIED OSTEOARTHRITIS, UNSPECIFIED SITE: ICD-10-CM

## 2023-11-13 DIAGNOSIS — G14 POSTPOLIO SYNDROME: ICD-10-CM

## 2023-11-13 DIAGNOSIS — Z98.89 OTHER SPECIFIED POSTPROCEDURAL STATES: Chronic | ICD-10-CM

## 2023-11-13 DIAGNOSIS — Z23 ENCOUNTER FOR IMMUNIZATION: ICD-10-CM

## 2023-11-13 DIAGNOSIS — Z00.00 ENCOUNTER FOR GENERAL ADULT MEDICAL EXAMINATION WITHOUT ABNORMAL FINDINGS: ICD-10-CM

## 2023-11-13 PROCEDURE — 99214 OFFICE O/P EST MOD 30 MIN: CPT | Mod: GC

## 2023-11-13 RX ORDER — ASPIRIN 81 MG
6.5 TABLET, DELAYED RELEASE (ENTERIC COATED) ORAL
Qty: 1 | Refills: 1 | Status: DISCONTINUED | COMMUNITY
Start: 2021-12-13 | End: 2023-11-13

## 2023-11-15 LAB
ANION GAP SERPL CALC-SCNC: 12 MMOL/L
BUN SERPL-MCNC: 11 MG/DL
CALCIUM SERPL-MCNC: 9.5 MG/DL
CHLORIDE SERPL-SCNC: 104 MMOL/L
CHOLEST SERPL-MCNC: 194 MG/DL
CO2 SERPL-SCNC: 26 MMOL/L
CREAT SERPL-MCNC: 0.53 MG/DL
EGFR: 106 ML/MIN/1.73M2
ESTIMATED AVERAGE GLUCOSE: 120 MG/DL
GLUCOSE SERPL-MCNC: 131 MG/DL
HBA1C MFR BLD HPLC: 5.8 %
HCT VFR BLD CALC: 41.4 %
HDLC SERPL-MCNC: 63 MG/DL
HGB BLD-MCNC: 13.3 G/DL
LDLC SERPL CALC-MCNC: 110 MG/DL
MCHC RBC-ENTMCNC: 29.6 PG
MCHC RBC-ENTMCNC: 32.1 GM/DL
MCV RBC AUTO: 92.2 FL
NONHDLC SERPL-MCNC: 131 MG/DL
PLATELET # BLD AUTO: 317 K/UL
POTASSIUM SERPL-SCNC: 3.8 MMOL/L
RBC # BLD: 4.49 M/UL
RBC # FLD: 12.3 %
SODIUM SERPL-SCNC: 143 MMOL/L
TRIGL SERPL-MCNC: 115 MG/DL
WBC # FLD AUTO: 8.18 K/UL

## 2023-11-22 RX ORDER — CELECOXIB 100 MG/1
100 CAPSULE ORAL TWICE DAILY
Qty: 90 | Refills: 2 | Status: ACTIVE | COMMUNITY
Start: 2022-10-26 | End: 1900-01-01

## 2023-11-22 RX ORDER — DULOXETINE HYDROCHLORIDE 30 MG/1
30 CAPSULE, DELAYED RELEASE PELLETS ORAL
Qty: 60 | Refills: 2 | Status: ACTIVE | COMMUNITY
Start: 2022-10-26 | End: 1900-01-01

## 2023-11-22 RX ORDER — ACETAMINOPHEN 500 MG/1
500 TABLET ORAL EVERY 8 HOURS
Qty: 90 | Refills: 3 | Status: ACTIVE | COMMUNITY
Start: 2021-08-17 | End: 1900-01-01

## 2023-12-29 NOTE — HISTORY OF PRESENT ILLNESS
[FreeTextEntry1] : Follow up  [de-identified] : 58-year-old woman who is followed for motor neuropathy, and associated skeletal myopathy (with dysphagia and dysarthria), and complicated by likely degenerative joint disease, subsequent to previous polio infection presents for follow up.  Patient's main complaint from last visit was chronic pain, advised to take duloxetine every day as opposed to once a week. Reports that she still chronic pain but it is better controlled. Was able to get physical therapy referral for an outside center as [facility] was not approved by her insurance. She also goes to adult day care where they do many activities and also have a toned down version of PT. Patient denies any other complaints today.

## 2024-01-04 ENCOUNTER — RX RENEWAL (OUTPATIENT)
Age: 59
End: 2024-01-04

## 2024-01-04 RX ORDER — BUDESONIDE AND FORMOTEROL FUMARATE DIHYDRATE 80; 4.5 UG/1; UG/1
80-4.5 AEROSOL RESPIRATORY (INHALATION) TWICE DAILY
Qty: 1 | Refills: 3 | Status: ACTIVE | COMMUNITY
Start: 2020-10-22 | End: 1900-01-01

## 2024-01-04 RX ORDER — CALCIUM CARBONATE 600 MG
600 TABLET ORAL
Qty: 30 | Refills: 5 | Status: ACTIVE | COMMUNITY
Start: 2019-06-12 | End: 1900-01-01

## 2024-01-04 RX ORDER — RESVER/WINE/BFL/GRPSD/PC/C/POM 200MG-60MG
25 MCG CAPSULE ORAL
Qty: 30 | Refills: 5 | Status: ACTIVE | COMMUNITY
Start: 2019-04-02 | End: 1900-01-01

## 2024-03-11 ENCOUNTER — RX RENEWAL (OUTPATIENT)
Age: 59
End: 2024-03-11

## 2024-05-09 ENCOUNTER — RX RENEWAL (OUTPATIENT)
Age: 59
End: 2024-05-09

## 2024-05-09 RX ORDER — FLUTICASONE PROPIONATE 50 UG/1
50 SPRAY, METERED NASAL
Qty: 16 | Refills: 0 | Status: ACTIVE | COMMUNITY
Start: 2019-02-11 | End: 1900-01-01

## 2024-06-10 ENCOUNTER — OUTPATIENT (OUTPATIENT)
Dept: OUTPATIENT SERVICES | Facility: HOSPITAL | Age: 59
LOS: 1 days | End: 2024-06-10

## 2024-06-10 ENCOUNTER — APPOINTMENT (OUTPATIENT)
Dept: INTERNAL MEDICINE | Facility: CLINIC | Age: 59
End: 2024-06-10
Payer: MEDICAID

## 2024-06-10 ENCOUNTER — LABORATORY RESULT (OUTPATIENT)
Age: 59
End: 2024-06-10

## 2024-06-10 VITALS
WEIGHT: 90 LBS | HEART RATE: 89 BPM | RESPIRATION RATE: 16 BRPM | BODY MASS INDEX: 15.95 KG/M2 | DIASTOLIC BLOOD PRESSURE: 64 MMHG | OXYGEN SATURATION: 99 % | HEIGHT: 63 IN | SYSTOLIC BLOOD PRESSURE: 100 MMHG

## 2024-06-10 DIAGNOSIS — Z98.89 OTHER SPECIFIED POSTPROCEDURAL STATES: Chronic | ICD-10-CM

## 2024-06-10 DIAGNOSIS — R63.4 ABNORMAL WEIGHT LOSS: ICD-10-CM

## 2024-06-10 DIAGNOSIS — Z00.00 ENCOUNTER FOR GENERAL ADULT MEDICAL EXAMINATION W/OUT ABNORMAL FINDINGS: ICD-10-CM

## 2024-06-10 DIAGNOSIS — G14 POSTPOLIO SYNDROME: ICD-10-CM

## 2024-06-10 DIAGNOSIS — M19.90 UNSPECIFIED OSTEOARTHRITIS, UNSPECIFIED SITE: ICD-10-CM

## 2024-06-10 PROCEDURE — 99213 OFFICE O/P EST LOW 20 MIN: CPT | Mod: GE

## 2024-06-10 NOTE — REVIEW OF SYSTEMS
[Recent Change In Weight] : ~T recent weight change [Negative] : Heme/Lymph [Fever] : no fever [Chills] : no chills [Night Sweats] : no night sweats [Discharge] : no discharge [Pain] : no pain [Vision Problems] : no vision problems [Earache] : no earache [Hearing Loss] : no hearing loss [Nasal Discharge] : no nasal discharge [Chest Pain] : no chest pain [Palpitations] : no palpitations [Shortness Of Breath] : no shortness of breath [Wheezing] : no wheezing [Cough] : no cough [Abdominal Pain] : no abdominal pain [Nausea] : no nausea [Constipation] : no constipation [Vomiting] : no vomiting [Melena] : no melena [Joint Pain] : no joint pain [Headache] : no headache [Dizziness] : no dizziness

## 2024-06-10 NOTE — PHYSICAL EXAM
[No Acute Distress] : no acute distress [Well Nourished] : well nourished [Well Developed] : well developed [Normal Sclera/Conjunctiva] : normal sclera/conjunctiva [PERRL] : pupils equal round and reactive to light [Normal Outer Ear/Nose] : the outer ears and nose were normal in appearance [No Respiratory Distress] : no respiratory distress  [No Accessory Muscle Use] : no accessory muscle use [Normal Rate] : normal rate  [Regular Rhythm] : with a regular rhythm [Normal S1, S2] : normal S1 and S2 [No Edema] : there was no peripheral edema [de-identified] : Soft, non-distended. Mild epigastric tenderness with subsequent belching  [de-identified] : Right lower extremity in brace, left LLE decreased quad strength.

## 2024-06-10 NOTE — HISTORY OF PRESENT ILLNESS
[FreeTextEntry1] : Follow up  [de-identified] : 60 yo M woman Hx motor neuropathy, and associated skeletal myopathy (with dysphagia and dysarthria), and complicated by likely degenerative joint disease, subsequent to previous polio infection presents for follow up.  Patient reports that her pain is better controlled on duloxetine daily and prn tylenol. She has new insurance and requested physical therapy referral for additional sessions. Does go to adult day care and exercises there.   Main complaint today is weight loss (99lbs --> 90lbs). Patient reports that she has been having issues with appetite over the past 6 months. Reports that this is a combination of her having dentures and difficulty chewing, as well as early satiety. Cannot have big meals without feeling abdominal discomfort. Denies other constitutional sxs such as fever, chills, malaise, depression. Reports possible endoscopy that was normal in 2013? at outside system, but unsure of exact date. Denies nausea, vomiting, reflux, diarrhea, constipation, melena, hematochezia, and abd pain.

## 2024-06-12 DIAGNOSIS — D64.9 ANEMIA, UNSPECIFIED: ICD-10-CM

## 2024-06-14 LAB
25(OH)D3 SERPL-MCNC: 48.3 NG/ML
ANION GAP SERPL CALC-SCNC: 13 MMOL/L
BUN SERPL-MCNC: 13 MG/DL
CALCIUM SERPL-MCNC: 9.3 MG/DL
CHLORIDE SERPL-SCNC: 105 MMOL/L
CO2 SERPL-SCNC: 24 MMOL/L
CREAT SERPL-MCNC: 0.5 MG/DL
EGFR: 107 ML/MIN/1.73M2
GLUCOSE SERPL-MCNC: 99 MG/DL
HCT VFR BLD CALC: 40.9 %
HGB BLD-MCNC: 12.7 G/DL
MCHC RBC-ENTMCNC: 29 PG
MCHC RBC-ENTMCNC: 31.1 GM/DL
MCV RBC AUTO: 93.4 FL
PLATELET # BLD AUTO: 346 K/UL
POTASSIUM SERPL-SCNC: 4 MMOL/L
RBC # BLD: 4.38 M/UL
RBC # FLD: 12.2 %
SODIUM SERPL-SCNC: 141 MMOL/L
VIT B12 SERPL-MCNC: >2000 PG/ML
WBC # FLD AUTO: 8.38 K/UL

## 2024-06-21 ENCOUNTER — OUTPATIENT (OUTPATIENT)
Dept: OUTPATIENT SERVICES | Facility: HOSPITAL | Age: 59
LOS: 1 days | End: 2024-06-21
Payer: MEDICAID

## 2024-06-21 ENCOUNTER — APPOINTMENT (OUTPATIENT)
Dept: MAMMOGRAPHY | Facility: IMAGING CENTER | Age: 59
End: 2024-06-21
Payer: MEDICAID

## 2024-06-21 ENCOUNTER — RESULT REVIEW (OUTPATIENT)
Age: 59
End: 2024-06-21

## 2024-06-21 DIAGNOSIS — A04.8 OTHER SPECIFIED BACTERIAL INTESTINAL INFECTIONS: ICD-10-CM

## 2024-06-21 DIAGNOSIS — Z98.89 OTHER SPECIFIED POSTPROCEDURAL STATES: Chronic | ICD-10-CM

## 2024-06-21 DIAGNOSIS — R63.4 ABNORMAL WEIGHT LOSS: ICD-10-CM

## 2024-06-21 LAB — H PYLORI AG STL QL: POSITIVE

## 2024-06-21 PROCEDURE — 77067 SCR MAMMO BI INCL CAD: CPT

## 2024-06-21 PROCEDURE — 77067 SCR MAMMO BI INCL CAD: CPT | Mod: 26

## 2024-06-21 PROCEDURE — 77063 BREAST TOMOSYNTHESIS BI: CPT

## 2024-06-21 PROCEDURE — 77063 BREAST TOMOSYNTHESIS BI: CPT | Mod: 26

## 2024-06-21 RX ORDER — METRONIDAZOLE 500 MG/1
500 TABLET ORAL
Qty: 56 | Refills: 0 | Status: ACTIVE | COMMUNITY
Start: 2024-06-21 | End: 1900-01-01

## 2024-06-21 RX ORDER — PANTOPRAZOLE 40 MG/1
40 TABLET, DELAYED RELEASE ORAL
Qty: 28 | Refills: 0 | Status: ACTIVE | COMMUNITY
Start: 2024-06-21 | End: 1900-01-01

## 2024-06-21 RX ORDER — BISMUTH SUBSALICYLATE 262 MG/1
262 TABLET, CHEWABLE ORAL
Qty: 112 | Refills: 0 | Status: ACTIVE | COMMUNITY
Start: 2024-06-21 | End: 1900-01-01

## 2024-06-21 RX ORDER — TETRACYCLINE HYDROCHLORIDE 500 MG/1
500 CAPSULE ORAL
Qty: 56 | Refills: 0 | Status: ACTIVE | COMMUNITY
Start: 2024-06-21 | End: 1900-01-01

## 2024-06-27 ENCOUNTER — NON-APPOINTMENT (OUTPATIENT)
Age: 59
End: 2024-06-27

## 2024-08-15 ENCOUNTER — APPOINTMENT (OUTPATIENT)
Dept: GASTROENTEROLOGY | Facility: CLINIC | Age: 59
End: 2024-08-15
Payer: MEDICAID

## 2024-08-15 VITALS
SYSTOLIC BLOOD PRESSURE: 105 MMHG | HEIGHT: 63 IN | OXYGEN SATURATION: 100 % | WEIGHT: 90 LBS | DIASTOLIC BLOOD PRESSURE: 70 MMHG | HEART RATE: 80 BPM | BODY MASS INDEX: 15.95 KG/M2 | RESPIRATION RATE: 16 BRPM

## 2024-08-15 DIAGNOSIS — R63.4 ABNORMAL WEIGHT LOSS: ICD-10-CM

## 2024-08-15 DIAGNOSIS — K21.9 GASTRO-ESOPHAGEAL REFLUX DISEASE W/OUT ESOPHAGITIS: ICD-10-CM

## 2024-08-15 DIAGNOSIS — Z12.11 ENCOUNTER FOR SCREENING FOR MALIGNANT NEOPLASM OF COLON: ICD-10-CM

## 2024-08-15 PROCEDURE — 99204 OFFICE O/P NEW MOD 45 MIN: CPT

## 2024-08-15 NOTE — HISTORY OF PRESENT ILLNESS
[FreeTextEntry1] : Usama Cody is a 59 year old female with a past medical history notable for cleft palate, previous polio infection with hx of motor neuropathy, and associated skeletal myopathy (with cervical dysphagia and dysarthria), and complicated by likely degenerative joint disease, presenting for follow of weight loss and belching.    Pt has bene having ongoing issues with weight loss (99lbs --> 90lbs) over the past couple of months with associated loss of appetite over the past 6 months. Reports that this is a combination of her having dentures and difficulty chewing, as well as early satiety. In addition tot he above, she has been having new onset reflux with some burning in her chest with associated bleching and bloating. She has been using Tums (2 pills 2-3 times per day) which has been helpful in aleviating her symptms. Her PCP, check a stool H. Pylori test which was positive and was started on Bismuth Quad therapy alhtough had to stop after three days dur to some vomiting and lip swelling. She went to [facility] and was dx with pill induced esophagitis due to the Tetracycline (no EGD or imaging was done). She was discharged with Maalox and Famotidine with overll improvement in her symptoms. Outside of the above, she denies any ongoing fevers, chills, nausea, vomiting, diarrhea, constipation, melena or hematochezia. She denies any issues with dysphagia at this time and is able to able to tolerate both liquids and solids with no issues. She has never had an EGD. She had a colonoscopy done in 2014 although she does not have any records of what they found.   [de-identified] : [facility] [place] IMPRESSION:     Trialed consistencies included thin liquid, honey and nectar thick  liquid, puree, and solid. There is laryngeal penetration for thin liquids  and nectar thick liquids. No aspiration is identified throughout the  study.

## 2024-08-15 NOTE — PHYSICAL EXAM
[Alert] : alert [Normal Voice/Communication] : normal voice/communication [Sclera] : the sclera and conjunctiva were normal [Hearing Threshold Finger Rub Not Citrus] : hearing was normal [Normal Appearance] : the appearance of the neck was normal [Normal] : heart rate was normal and rhythm regular, normal S1 and S2, no murmurs [Bowel Sounds] : normal bowel sounds [Abdomen Tenderness] : non-tender [No Masses] : no abdominal mass palpated [Abdomen Soft] : soft [Oriented To Time, Place, And Person] : oriented to person, place, and time [de-identified] : Cleft Palate

## 2024-08-15 NOTE — END OF VISIT
[] : Fellow [FreeTextEntry2] : Assessment and rec. as noted- plan for EGD for further evaluation. [Time Spent: ___ minutes] : I have spent [unfilled] minutes of time on the encounter.

## 2024-08-15 NOTE — ASSESSMENT
[FreeTextEntry1] : Usama Cody is a 59 year old female with a past medical history notable for cleft palate, previous polio infection with hx of motor neuropathy, and associated skeletal myopathy (with cervical dysphagia and dysarthria), and complicated by likely degenerative joint disease, presenting for follow of weight loss and belching.   #GERD  #Weight Loss Presenting with new onset reflux and bloating with associated 10 lb weight loss. Symptoms overall improved with Tums. Given ongoing weight loss will plan to perform an EGD for luminal assesment after completion of treatment for H. pylori in 4-6 weeks with repeat bx to asses for eradication.   #H. Pylori  Stool testing with PCP 6/2024 notable for +Stool Ag. Treated with a course if Bismuth Quad therapy. Will plan to treat with high dose dual therapy (PPI+Amox x 14 days) and plan to perform EGD in 4-6 weeks to confirm eradication   #CRC Surveillance Last colonoscopy done in 2014 per pt although no records available. Due for repeat screening. Will plan for colonoscopy with Golytely prep.     Reccomednations: -Start PPI BID and Amox 1 g TID x 14 days to treat H. Pylori infection -EGD (bx for H. pylori and ongoing weight loss) and colonoscopy for CRC screening with Golytely prep in 4-6 weeks   Follow up: 1-2 months

## 2024-08-20 RX ORDER — AMOXICILLIN 500 MG/1
500 CAPSULE ORAL 3 TIMES DAILY
Qty: 42 | Refills: 0 | Status: ACTIVE | COMMUNITY
Start: 2024-08-20 | End: 1900-01-01

## 2024-08-20 RX ORDER — OMEPRAZOLE 20 MG/1
20 CAPSULE, DELAYED RELEASE ORAL TWICE DAILY
Qty: 28 | Refills: 0 | Status: ACTIVE | COMMUNITY
Start: 2024-08-20 | End: 1900-01-01

## 2024-08-20 RX ORDER — POLYETHYLENE GLYCOL 3350 AND ELECTROLYTES WITH LEMON FLAVOR 236; 22.74; 6.74; 5.86; 2.97 G/4L; G/4L; G/4L; G/4L; G/4L
236 POWDER, FOR SOLUTION ORAL
Qty: 1 | Refills: 0 | Status: ACTIVE | COMMUNITY
Start: 2024-08-20 | End: 1900-01-01

## 2024-09-13 ENCOUNTER — OUTPATIENT (OUTPATIENT)
Dept: OUTPATIENT SERVICES | Facility: HOSPITAL | Age: 59
LOS: 1 days | Discharge: HOME | End: 2024-09-13
Payer: MEDICAID

## 2024-09-13 ENCOUNTER — TRANSCRIPTION ENCOUNTER (OUTPATIENT)
Age: 59
End: 2024-09-13

## 2024-09-13 ENCOUNTER — RESULT REVIEW (OUTPATIENT)
Age: 59
End: 2024-09-13

## 2024-09-13 VITALS
RESPIRATION RATE: 13 BRPM | HEART RATE: 89 BPM | DIASTOLIC BLOOD PRESSURE: 70 MMHG | OXYGEN SATURATION: 100 % | SYSTOLIC BLOOD PRESSURE: 92 MMHG

## 2024-09-13 VITALS
HEART RATE: 67 BPM | DIASTOLIC BLOOD PRESSURE: 58 MMHG | TEMPERATURE: 98 F | HEIGHT: 63 IN | RESPIRATION RATE: 18 BRPM | WEIGHT: 89.95 LBS | OXYGEN SATURATION: 100 % | SYSTOLIC BLOOD PRESSURE: 103 MMHG

## 2024-09-13 DIAGNOSIS — Z98.89 OTHER SPECIFIED POSTPROCEDURAL STATES: Chronic | ICD-10-CM

## 2024-09-13 DIAGNOSIS — Z12.11 ENCOUNTER FOR SCREENING FOR MALIGNANT NEOPLASM OF COLON: ICD-10-CM

## 2024-09-13 PROCEDURE — 43239 EGD BIOPSY SINGLE/MULTIPLE: CPT | Mod: GC

## 2024-09-13 PROCEDURE — 88342 IMHCHEM/IMCYTCHM 1ST ANTB: CPT | Mod: 26

## 2024-09-13 PROCEDURE — 88305 TISSUE EXAM BY PATHOLOGIST: CPT | Mod: 26

## 2024-09-13 PROCEDURE — 45378 DIAGNOSTIC COLONOSCOPY: CPT | Mod: GC

## 2024-09-13 RX ORDER — SODIUM CHLORIDE 9 MG/ML
500 INJECTION INTRAMUSCULAR; INTRAVENOUS; SUBCUTANEOUS
Refills: 0 | Status: ACTIVE | OUTPATIENT
Start: 2024-09-13

## 2024-09-13 NOTE — ASU PATIENT PROFILE, ADULT - NSICDXPASTSURGICALHX_GEN_ALL_CORE_FT
PAST SURGICAL HISTORY:  No significant past surgical history     S/P  section 's    Status post osteotomy left foot

## 2024-09-13 NOTE — ASU PATIENT PROFILE, ADULT - NSICDXPASTMEDICALHX_GEN_ALL_CORE_FT
PAST MEDICAL HISTORY:  Cleft palate     Depression, unspecified depression type     Polio     Polio

## 2024-09-13 NOTE — PRE PROCEDURE NOTE - PRE PROCEDURE EVALUATION
Attending Physician:       Dr. Savage                     Procedure: EGD/colonoscopy    Indication for Procedure: weight loss, CRC screening  ________________________________________________________  PAST MEDICAL & SURGICAL HISTORY:  Polio      Polio      Cleft palate      Depression, unspecified depression type      No significant past surgical history      S/P  section  1990's      Status post osteotomy  left foot        ALLERGIES:  No Known Allergies    HOME MEDICATIONS:  benzonatate 100 mg oral capsule: 1 cap(s) orally every 8 hours, As needed, Cough  clopidogrel 75 mg oral tablet: 1 tab(s) orally once a day  docusate sodium 100 mg oral capsule: 1 cap(s) orally 3 times a day  DULoxetine 30 mg oral delayed release capsule: 1 cap(s) orally once a day  guaiFENesin 100 mg/5 mL oral liquid: 10 milliliter(s) orally every 6 hours, As needed, Cough  heparin: 5000 unit(s) subcutaneous every 8 hours  ProAir HFA 90 mcg/inh inhalation aerosol: 2 puff(s) inhaled 4 times a day, As Needed  senna oral tablet: 2 tab(s) orally once a day (at bedtime)    AICD/PPM: [ ] yes   [ ] no    PERTINENT LAB DATA:                      PHYSICAL EXAMINATION:    Height (cm): 160  Weight (kg): 40.8  BMI (kg/m2): 15.9  BSA (m2): 1.38T(C): 36.9  HR: 67  BP: 103/58  RR: 18  SpO2: 100%    Constitutional: NAD  HEENT: PERRLA, EOMI,    Neck:  No JVD  Respiratory: CTAB/L  Cardiovascular: S1 and S2  Gastrointestinal: BS+, soft, NT/ND  Extremities: No peripheral edema  Neurological: A/O x 3, no focal deficits  Psychiatric: Normal mood, normal affect  Skin: No rashes    ASA Class: I [ ]  II [x ]  III [ ]  IV [ ]    COMMENTS:    The patient is a suitable candidate for the planned procedure unless box checked [ ]  No, explain:

## 2024-09-13 NOTE — ASU PATIENT PROFILE, ADULT - FALL HARM RISK - HARM RISK INTERVENTIONS

## 2024-09-19 LAB — SURGICAL PATHOLOGY STUDY: SIGNIFICANT CHANGE UP

## 2024-09-26 ENCOUNTER — OUTPATIENT (OUTPATIENT)
Dept: OUTPATIENT SERVICES | Facility: HOSPITAL | Age: 59
LOS: 1 days | End: 2024-09-26

## 2024-09-26 ENCOUNTER — APPOINTMENT (OUTPATIENT)
Dept: INTERNAL MEDICINE | Facility: CLINIC | Age: 59
End: 2024-09-26
Payer: MEDICAID

## 2024-09-26 ENCOUNTER — APPOINTMENT (OUTPATIENT)
Dept: INTERNAL MEDICINE | Facility: CLINIC | Age: 59
End: 2024-09-26

## 2024-09-26 VITALS
OXYGEN SATURATION: 98 % | SYSTOLIC BLOOD PRESSURE: 96 MMHG | WEIGHT: 81 LBS | HEART RATE: 91 BPM | DIASTOLIC BLOOD PRESSURE: 68 MMHG | HEIGHT: 63 IN | BODY MASS INDEX: 14.35 KG/M2

## 2024-09-26 DIAGNOSIS — Z98.89 OTHER SPECIFIED POSTPROCEDURAL STATES: Chronic | ICD-10-CM

## 2024-09-26 DIAGNOSIS — G14 POSTPOLIO SYNDROME: ICD-10-CM

## 2024-09-26 DIAGNOSIS — Z00.00 ENCOUNTER FOR GENERAL ADULT MEDICAL EXAMINATION W/OUT ABNORMAL FINDINGS: ICD-10-CM

## 2024-09-26 DIAGNOSIS — Z23 ENCOUNTER FOR IMMUNIZATION: ICD-10-CM

## 2024-09-26 DIAGNOSIS — R63.4 ABNORMAL WEIGHT LOSS: ICD-10-CM

## 2024-09-26 PROCEDURE — 99213 OFFICE O/P EST LOW 20 MIN: CPT

## 2024-09-26 RX ORDER — INFANT FORMULA, IRON/DHA/ARA 2.32 G/1
POWDER (GRAM) ORAL
Qty: 60 | Refills: 2 | Status: ACTIVE | COMMUNITY
Start: 2024-09-26 | End: 1900-01-01

## 2024-09-26 RX ORDER — LIDOCAINE 40 MG/G
4 PATCH TOPICAL
Qty: 90 | Refills: 0 | Status: ACTIVE | COMMUNITY
Start: 2024-09-26 | End: 1900-01-01

## 2024-09-26 NOTE — PHYSICAL EXAM
[No Acute Distress] : no acute distress [Well Nourished] : well nourished [Well Developed] : well developed [No Respiratory Distress] : no respiratory distress  [No Accessory Muscle Use] : no accessory muscle use [Clear to Auscultation] : lungs were clear to auscultation bilaterally [Normal Rate] : normal rate  [Regular Rhythm] : with a regular rhythm [Normal S1, S2] : normal S1 and S2 [No Edema] : there was no peripheral edema [Soft] : abdomen soft [Non-distended] : non-distended [de-identified] : + tenderness in the left trapezius muscle  [de-identified] : Mild tenderness to palpation in the epigastric region

## 2024-09-26 NOTE — REVIEW OF SYSTEMS
[Abdominal Pain] : abdominal pain [Fever] : no fever [Chills] : no chills [Vision Problems] : no vision problems [Chest Pain] : no chest pain [Palpitations] : no palpitations [Orthopnea] : no orthopnea [Shortness Of Breath] : no shortness of breath [Wheezing] : no wheezing [Cough] : no cough [Nausea] : no nausea [Constipation] : no constipation [Diarrhea] : diarrhea [Vomiting] : no vomiting [Dysuria] : no dysuria [Incontinence] : no incontinence [Dizziness] : no dizziness

## 2024-09-26 NOTE — HISTORY OF PRESENT ILLNESS
[FreeTextEntry1] : Follow up  [de-identified] : 60 yo M woman Hx motor neuropathy, and associated skeletal myopathy (with dysphagia and dysarthria), and complicated by likely degenerative joint disease 2/2 previous polio infection, recent H. pylori infection presents for follow up.  H. pylori - During previous visit, patient had 10lbs weight loss along with loss of appetite, early satiety, belching, bloating. Tested positive for H. pylori on stool testing, started on quadruple therapy. However, patient only tolerated 3 days of therapy before developing vomiting and lip swelling. Patient was also hospitalized at [facility] for possible pill esophagitis (however, no EGD performed at this time). Followed up with GI, sent for amoxicillin and PPI for 14 days, completed therapy without issues.  EGD was performed on 9/13, showing chronic inactive gastritis without intestinal metaplasia. Does also show that there are rare H pylori organisms.  Patient reports improvement in her appetite and early satiety, however, she continues to belch (notes improvement though). Also has additional weight loss from previous visit of 10 lbs.   Post polio syndrome - patient reports she stopped taking duloxetine for pain because it was causing her to have upset stomach and headaches for the past month. Unclear if this is actually due to gastritis. She stopped taking celecoxib, only taking tylenol and reports that pain is well controlled.

## 2024-09-27 ENCOUNTER — NON-APPOINTMENT (OUTPATIENT)
Age: 59
End: 2024-09-27

## 2024-09-27 DIAGNOSIS — G14 POSTPOLIO SYNDROME: ICD-10-CM

## 2024-09-27 DIAGNOSIS — A04.8 OTHER SPECIFIED BACTERIAL INTESTINAL INFECTIONS: ICD-10-CM

## 2024-09-27 DIAGNOSIS — R63.4 ABNORMAL WEIGHT LOSS: ICD-10-CM

## 2024-09-27 DIAGNOSIS — Z23 ENCOUNTER FOR IMMUNIZATION: ICD-10-CM

## 2024-09-30 ENCOUNTER — NON-APPOINTMENT (OUTPATIENT)
Age: 59
End: 2024-09-30

## 2024-09-30 LAB — H PYLORI AG STL QL: NEGATIVE

## 2024-10-01 ENCOUNTER — APPOINTMENT (OUTPATIENT)
Dept: ENDOCRINOLOGY | Facility: CLINIC | Age: 59
End: 2024-10-01

## 2024-10-02 DIAGNOSIS — A04.8 OTHER SPECIFIED BACTERIAL INTESTINAL INFECTIONS: ICD-10-CM

## 2024-10-02 RX ORDER — AMOXICILLIN 500 MG/1
500 TABLET, FILM COATED ORAL TWICE DAILY
Qty: 56 | Refills: 0 | Status: ACTIVE | COMMUNITY
Start: 2024-10-02 | End: 1900-01-01

## 2024-10-02 RX ORDER — PANTOPRAZOLE 40 MG/1
40 TABLET, DELAYED RELEASE ORAL
Qty: 28 | Refills: 0 | Status: ACTIVE | COMMUNITY
Start: 2024-10-02 | End: 1900-01-01

## 2024-10-02 RX ORDER — RIFABUTIN 150 MG/1
150 CAPSULE ORAL DAILY
Qty: 28 | Refills: 0 | Status: ACTIVE | COMMUNITY
Start: 2024-10-02 | End: 1900-01-01

## 2024-10-07 DIAGNOSIS — R63.4 ABNORMAL WEIGHT LOSS: ICD-10-CM

## 2024-10-18 ENCOUNTER — APPOINTMENT (OUTPATIENT)
Dept: CT IMAGING | Facility: IMAGING CENTER | Age: 59
End: 2024-10-18
Payer: MEDICAID

## 2024-10-18 ENCOUNTER — OUTPATIENT (OUTPATIENT)
Dept: OUTPATIENT SERVICES | Facility: HOSPITAL | Age: 59
LOS: 1 days | End: 2024-10-18
Payer: MEDICAID

## 2024-10-18 ENCOUNTER — OUTPATIENT (OUTPATIENT)
Dept: OUTPATIENT SERVICES | Facility: HOSPITAL | Age: 59
LOS: 1 days | End: 2024-10-18

## 2024-10-18 ENCOUNTER — RESULT REVIEW (OUTPATIENT)
Age: 59
End: 2024-10-18

## 2024-10-18 DIAGNOSIS — Z98.89 OTHER SPECIFIED POSTPROCEDURAL STATES: Chronic | ICD-10-CM

## 2024-10-18 DIAGNOSIS — R63.4 ABNORMAL WEIGHT LOSS: ICD-10-CM

## 2024-10-18 DIAGNOSIS — Z00.8 ENCOUNTER FOR OTHER GENERAL EXAMINATION: ICD-10-CM

## 2024-10-18 PROCEDURE — 71260 CT THORAX DX C+: CPT

## 2024-10-18 PROCEDURE — 74177 CT ABD & PELVIS W/CONTRAST: CPT | Mod: 26

## 2024-10-18 PROCEDURE — 71260 CT THORAX DX C+: CPT | Mod: 26

## 2024-10-18 PROCEDURE — 74177 CT ABD & PELVIS W/CONTRAST: CPT

## 2024-10-31 ENCOUNTER — NON-APPOINTMENT (OUTPATIENT)
Age: 59
End: 2024-10-31

## 2024-11-06 ENCOUNTER — NON-APPOINTMENT (OUTPATIENT)
Age: 59
End: 2024-11-06

## 2024-11-18 ENCOUNTER — OUTPATIENT (OUTPATIENT)
Dept: OUTPATIENT SERVICES | Facility: HOSPITAL | Age: 59
LOS: 1 days | End: 2024-11-18

## 2024-11-18 ENCOUNTER — APPOINTMENT (OUTPATIENT)
Dept: INTERNAL MEDICINE | Facility: CLINIC | Age: 59
End: 2024-11-18
Payer: MEDICAID

## 2024-11-18 VITALS
WEIGHT: 90 LBS | HEIGHT: 63 IN | HEART RATE: 90 BPM | BODY MASS INDEX: 15.95 KG/M2 | OXYGEN SATURATION: 98 % | DIASTOLIC BLOOD PRESSURE: 60 MMHG | SYSTOLIC BLOOD PRESSURE: 98 MMHG

## 2024-11-18 DIAGNOSIS — R63.4 ABNORMAL WEIGHT LOSS: ICD-10-CM

## 2024-11-18 DIAGNOSIS — A04.8 OTHER SPECIFIED BACTERIAL INTESTINAL INFECTIONS: ICD-10-CM

## 2024-11-18 DIAGNOSIS — R05.8 OTHER SPECIFIED COUGH: ICD-10-CM

## 2024-11-18 DIAGNOSIS — M54.16 RADICULOPATHY, LUMBAR REGION: ICD-10-CM

## 2024-11-18 DIAGNOSIS — M16.0 BILATERAL PRIMARY OSTEOARTHRITIS OF HIP: ICD-10-CM

## 2024-11-18 DIAGNOSIS — R21 RASH AND OTHER NONSPECIFIC SKIN ERUPTION: ICD-10-CM

## 2024-11-18 DIAGNOSIS — J30.1 ALLERGIC RHINITIS DUE TO POLLEN: ICD-10-CM

## 2024-11-18 DIAGNOSIS — Z98.89 OTHER SPECIFIED POSTPROCEDURAL STATES: Chronic | ICD-10-CM

## 2024-11-18 DIAGNOSIS — J30.89 OTHER ALLERGIC RHINITIS: ICD-10-CM

## 2024-11-18 DIAGNOSIS — R06.02 SHORTNESS OF BREATH: ICD-10-CM

## 2024-11-18 DIAGNOSIS — J30.2 OTHER SEASONAL ALLERGIC RHINITIS: ICD-10-CM

## 2024-11-18 PROCEDURE — 99213 OFFICE O/P EST LOW 20 MIN: CPT

## 2024-11-18 PROCEDURE — G2211 COMPLEX E/M VISIT ADD ON: CPT | Mod: NC

## 2024-11-18 RX ORDER — FLUTICASONE PROPIONATE 50 UG/1
50 SPRAY, METERED NASAL DAILY
Qty: 1 | Refills: 3 | Status: ACTIVE | COMMUNITY
Start: 2024-11-18 | End: 1900-01-01

## 2024-12-02 ENCOUNTER — APPOINTMENT (OUTPATIENT)
Dept: INTERNAL MEDICINE | Facility: CLINIC | Age: 59
End: 2024-12-02

## 2024-12-04 ENCOUNTER — NON-APPOINTMENT (OUTPATIENT)
Age: 59
End: 2024-12-04

## 2024-12-09 ENCOUNTER — APPOINTMENT (OUTPATIENT)
Dept: ORTHOPEDIC SURGERY | Facility: CLINIC | Age: 59
End: 2024-12-09
Payer: MEDICAID

## 2024-12-09 DIAGNOSIS — M54.16 RADICULOPATHY, LUMBAR REGION: ICD-10-CM

## 2024-12-09 PROCEDURE — 99204 OFFICE O/P NEW MOD 45 MIN: CPT

## 2024-12-09 PROCEDURE — 72110 X-RAY EXAM L-2 SPINE 4/>VWS: CPT

## 2024-12-09 RX ORDER — TIZANIDINE 2 MG/1
2 TABLET ORAL
Qty: 24 | Refills: 0 | Status: ACTIVE | COMMUNITY
Start: 2024-12-09 | End: 1900-01-01

## 2024-12-09 RX ORDER — DICLOFENAC SODIUM 75 MG/1
75 TABLET, DELAYED RELEASE ORAL
Qty: 40 | Refills: 0 | Status: ACTIVE | COMMUNITY
Start: 2024-12-09 | End: 1900-01-01

## 2024-12-19 NOTE — SOCIAL HISTORY
Spoke with patient and informed of lab test results as reviewed by PCP along with instructions.  Verbalized understanding of instructions.  Patient wondering when PCP wants to follow up with patient and if any other lab tests need to be completed.  Will send to PCP.     [None] : none [de-identified] : son

## 2024-12-27 ENCOUNTER — OUTPATIENT (OUTPATIENT)
Dept: OUTPATIENT SERVICES | Facility: HOSPITAL | Age: 59
LOS: 1 days | End: 2024-12-27
Payer: MEDICAID

## 2024-12-27 ENCOUNTER — APPOINTMENT (OUTPATIENT)
Dept: MRI IMAGING | Facility: IMAGING CENTER | Age: 59
End: 2024-12-27
Payer: MEDICAID

## 2024-12-27 DIAGNOSIS — M54.16 RADICULOPATHY, LUMBAR REGION: ICD-10-CM

## 2024-12-27 DIAGNOSIS — Z98.89 OTHER SPECIFIED POSTPROCEDURAL STATES: Chronic | ICD-10-CM

## 2024-12-27 PROCEDURE — 72148 MRI LUMBAR SPINE W/O DYE: CPT | Mod: 26

## 2024-12-27 PROCEDURE — 72148 MRI LUMBAR SPINE W/O DYE: CPT

## 2025-01-13 ENCOUNTER — APPOINTMENT (OUTPATIENT)
Dept: ORTHOPEDIC SURGERY | Facility: CLINIC | Age: 60
End: 2025-01-13

## 2025-02-10 ENCOUNTER — OUTPATIENT (OUTPATIENT)
Dept: OUTPATIENT SERVICES | Facility: HOSPITAL | Age: 60
LOS: 1 days | End: 2025-02-10

## 2025-02-10 ENCOUNTER — APPOINTMENT (OUTPATIENT)
Dept: INTERNAL MEDICINE | Facility: CLINIC | Age: 60
End: 2025-02-10
Payer: MEDICAID

## 2025-02-10 VITALS
WEIGHT: 90 LBS | OXYGEN SATURATION: 97 % | BODY MASS INDEX: 15.95 KG/M2 | HEART RATE: 86 BPM | DIASTOLIC BLOOD PRESSURE: 71 MMHG | HEIGHT: 63 IN | SYSTOLIC BLOOD PRESSURE: 108 MMHG

## 2025-02-10 DIAGNOSIS — Z98.89 OTHER SPECIFIED POSTPROCEDURAL STATES: Chronic | ICD-10-CM

## 2025-02-10 DIAGNOSIS — M16.0 BILATERAL PRIMARY OSTEOARTHRITIS OF HIP: ICD-10-CM

## 2025-02-10 DIAGNOSIS — A04.8 OTHER SPECIFIED BACTERIAL INTESTINAL INFECTIONS: ICD-10-CM

## 2025-02-10 DIAGNOSIS — R63.4 ABNORMAL WEIGHT LOSS: ICD-10-CM

## 2025-02-10 DIAGNOSIS — G14 POSTPOLIO SYNDROME: ICD-10-CM

## 2025-02-10 DIAGNOSIS — Z00.00 ENCOUNTER FOR GENERAL ADULT MEDICAL EXAMINATION W/OUT ABNORMAL FINDINGS: ICD-10-CM

## 2025-02-10 PROCEDURE — 99213 OFFICE O/P EST LOW 20 MIN: CPT

## 2025-02-10 PROCEDURE — G2211 COMPLEX E/M VISIT ADD ON: CPT | Mod: NC

## 2025-02-11 ENCOUNTER — NON-APPOINTMENT (OUTPATIENT)
Age: 60
End: 2025-02-11

## 2025-02-12 DIAGNOSIS — Z23 ENCOUNTER FOR IMMUNIZATION: ICD-10-CM

## 2025-02-12 LAB
ANION GAP SERPL CALC-SCNC: 9 MMOL/L
BUN SERPL-MCNC: 14 MG/DL
CALCIUM SERPL-MCNC: 9.5 MG/DL
CHLORIDE SERPL-SCNC: 106 MMOL/L
CHOLEST SERPL-MCNC: 194 MG/DL
CO2 SERPL-SCNC: 27 MMOL/L
CREAT SERPL-MCNC: 0.51 MG/DL
EGFR: 107 ML/MIN/1.73M2
ESTIMATED AVERAGE GLUCOSE: 114 MG/DL
GLUCOSE SERPL-MCNC: 112 MG/DL
HBA1C MFR BLD HPLC: 5.6 %
HBV CORE IGG+IGM SER QL: NONREACTIVE
HBV SURFACE AB SER QL: NONREACTIVE
HBV SURFACE AG SER QL: NONREACTIVE
HDLC SERPL-MCNC: 58 MG/DL
LDLC SERPL CALC-MCNC: 117 MG/DL
NONHDLC SERPL-MCNC: 136 MG/DL
POTASSIUM SERPL-SCNC: 3.9 MMOL/L
SODIUM SERPL-SCNC: 141 MMOL/L
TRIGL SERPL-MCNC: 109 MG/DL

## 2025-02-17 DIAGNOSIS — R63.4 ABNORMAL WEIGHT LOSS: ICD-10-CM

## 2025-02-17 DIAGNOSIS — G14 POSTPOLIO SYNDROME: ICD-10-CM

## 2025-02-17 DIAGNOSIS — M16.0 BILATERAL PRIMARY OSTEOARTHRITIS OF HIP: ICD-10-CM

## 2025-02-17 DIAGNOSIS — M54.16 RADICULOPATHY, LUMBAR REGION: ICD-10-CM

## 2025-02-17 DIAGNOSIS — A04.8 OTHER SPECIFIED BACTERIAL INTESTINAL INFECTIONS: ICD-10-CM

## 2025-02-19 ENCOUNTER — APPOINTMENT (OUTPATIENT)
Dept: ORTHOPEDIC SURGERY | Facility: CLINIC | Age: 60
End: 2025-02-19
Payer: MEDICAID

## 2025-02-19 DIAGNOSIS — M54.16 RADICULOPATHY, LUMBAR REGION: ICD-10-CM

## 2025-02-19 PROCEDURE — 99214 OFFICE O/P EST MOD 30 MIN: CPT

## 2025-02-19 RX ORDER — DICLOFENAC SODIUM 75 MG/1
75 TABLET, DELAYED RELEASE ORAL
Qty: 60 | Refills: 0 | Status: ACTIVE | COMMUNITY
Start: 2025-02-19 | End: 1900-01-01

## 2025-02-28 ENCOUNTER — MED ADMIN CHARGE (OUTPATIENT)
Age: 60
End: 2025-02-28

## 2025-02-28 ENCOUNTER — APPOINTMENT (OUTPATIENT)
Dept: INTERNAL MEDICINE | Facility: CLINIC | Age: 60
End: 2025-02-28

## 2025-02-28 ENCOUNTER — OUTPATIENT (OUTPATIENT)
Dept: OUTPATIENT SERVICES | Facility: HOSPITAL | Age: 60
LOS: 1 days | End: 2025-02-28

## 2025-02-28 VITALS
HEART RATE: 84 BPM | HEIGHT: 63 IN | SYSTOLIC BLOOD PRESSURE: 90 MMHG | BODY MASS INDEX: 15.77 KG/M2 | WEIGHT: 89 LBS | OXYGEN SATURATION: 99 % | DIASTOLIC BLOOD PRESSURE: 66 MMHG

## 2025-02-28 DIAGNOSIS — Z01.419 ENCOUNTER FOR GYNECOLOGICAL EXAMINATION (GENERAL) (ROUTINE) W/OUT ABNORMAL FINDINGS: ICD-10-CM

## 2025-02-28 DIAGNOSIS — G14 POSTPOLIO SYNDROME: ICD-10-CM

## 2025-02-28 DIAGNOSIS — Z23 ENCOUNTER FOR IMMUNIZATION: ICD-10-CM

## 2025-02-28 PROCEDURE — G0101: CPT

## 2025-02-28 PROCEDURE — 99214 OFFICE O/P EST MOD 30 MIN: CPT | Mod: 25

## 2025-03-03 DIAGNOSIS — Z23 ENCOUNTER FOR IMMUNIZATION: ICD-10-CM

## 2025-03-04 DIAGNOSIS — Z01.419 ENCOUNTER FOR GYNECOLOGICAL EXAMINATION (GENERAL) (ROUTINE) WITHOUT ABNORMAL FINDINGS: ICD-10-CM

## 2025-03-04 DIAGNOSIS — G14 POSTPOLIO SYNDROME: ICD-10-CM

## 2025-03-05 LAB
CYTOLOGY CVX/VAG DOC THIN PREP: ABNORMAL
HPV HIGH+LOW RISK DNA PNL CVX: NOT DETECTED

## 2025-03-06 ENCOUNTER — OUTPATIENT (OUTPATIENT)
Dept: OUTPATIENT SERVICES | Facility: HOSPITAL | Age: 60
LOS: 1 days | End: 2025-03-06
Payer: MEDICAID

## 2025-03-06 ENCOUNTER — APPOINTMENT (OUTPATIENT)
Dept: RADIOLOGY | Facility: IMAGING CENTER | Age: 60
End: 2025-03-06
Payer: MEDICAID

## 2025-03-06 DIAGNOSIS — Z01.419 ENCOUNTER FOR GYNECOLOGICAL EXAMINATION (GENERAL) (ROUTINE) WITHOUT ABNORMAL FINDINGS: ICD-10-CM

## 2025-03-06 DIAGNOSIS — Z98.89 OTHER SPECIFIED POSTPROCEDURAL STATES: Chronic | ICD-10-CM

## 2025-03-06 PROCEDURE — 77080 DXA BONE DENSITY AXIAL: CPT

## 2025-03-06 PROCEDURE — 77080 DXA BONE DENSITY AXIAL: CPT | Mod: 26

## 2025-03-11 ENCOUNTER — APPOINTMENT (OUTPATIENT)
Dept: PHYSICAL MEDICINE AND REHAB | Facility: CLINIC | Age: 60
End: 2025-03-11
Payer: MEDICAID

## 2025-03-11 DIAGNOSIS — G89.29 LOW BACK PAIN, UNSPECIFIED: ICD-10-CM

## 2025-03-11 DIAGNOSIS — M54.50 LOW BACK PAIN, UNSPECIFIED: ICD-10-CM

## 2025-03-11 DIAGNOSIS — M47.816 SPONDYLOSIS W/OUT MYELOPATHY OR RADICULOPATHY, LUMBAR REGION: ICD-10-CM

## 2025-03-11 PROCEDURE — 99204 OFFICE O/P NEW MOD 45 MIN: CPT

## 2025-03-20 ENCOUNTER — APPOINTMENT (OUTPATIENT)
Dept: GASTROENTEROLOGY | Facility: CLINIC | Age: 60
End: 2025-03-20
Payer: MEDICAID

## 2025-03-20 ENCOUNTER — OUTPATIENT (OUTPATIENT)
Dept: OUTPATIENT SERVICES | Facility: HOSPITAL | Age: 60
LOS: 1 days | End: 2025-03-20

## 2025-03-20 VITALS
DIASTOLIC BLOOD PRESSURE: 60 MMHG | SYSTOLIC BLOOD PRESSURE: 90 MMHG | WEIGHT: 90 LBS | HEART RATE: 88 BPM | OXYGEN SATURATION: 98 % | HEIGHT: 63 IN | BODY MASS INDEX: 15.95 KG/M2

## 2025-03-20 DIAGNOSIS — Z98.89 OTHER SPECIFIED POSTPROCEDURAL STATES: Chronic | ICD-10-CM

## 2025-03-20 DIAGNOSIS — A04.8 OTHER SPECIFIED BACTERIAL INTESTINAL INFECTIONS: ICD-10-CM

## 2025-03-20 PROCEDURE — 99213 OFFICE O/P EST LOW 20 MIN: CPT

## 2025-03-25 DIAGNOSIS — A04.8 OTHER SPECIFIED BACTERIAL INTESTINAL INFECTIONS: ICD-10-CM

## 2025-03-27 NOTE — PHYSICAL EXAM
Acute on chronic nontraumatic right knee pain  Recommend right knee brace  Likely secondary to arthritic pain  Trial of topical Voltaren  Consider cortisone injection in the future if symptoms persist    Orders:    Diclofenac Sodium (VOLTAREN) 1 %; Apply 2 g topically 3 (three) times a day as needed (knee pain)     [Normal Sclera/Conjunctiva] : normal sclera/conjunctiva [Normal Outer Ear/Nose] : the outer ears and nose were normal in appearance [No Lymphadenopathy] : no lymphadenopathy [Supple] : supple [No Edema] : there was no peripheral edema [Normal] : soft, non-tender, non-distended, no masses palpated, no HSM and normal bowel sounds [No Spinal Tenderness] : no spinal tenderness [Grossly Normal Strength/Tone] : grossly normal strength/tone [No Rash] : no rash [de-identified] : N [de-identified] : +wheelchair bound  [de-identified] : depressed mood, anxious mood

## 2025-03-28 ENCOUNTER — APPOINTMENT (OUTPATIENT)
Dept: INTERNAL MEDICINE | Facility: CLINIC | Age: 60
End: 2025-03-28

## 2025-04-07 ENCOUNTER — APPOINTMENT (OUTPATIENT)
Dept: ORTHOPEDIC SURGERY | Facility: CLINIC | Age: 60
End: 2025-04-07

## 2025-05-28 ENCOUNTER — OUTPATIENT (OUTPATIENT)
Dept: OUTPATIENT SERVICES | Facility: HOSPITAL | Age: 60
LOS: 1 days | End: 2025-05-28

## 2025-05-28 ENCOUNTER — MED ADMIN CHARGE (OUTPATIENT)
Age: 60
End: 2025-05-28

## 2025-05-28 ENCOUNTER — APPOINTMENT (OUTPATIENT)
Dept: INTERNAL MEDICINE | Facility: CLINIC | Age: 60
End: 2025-05-28
Payer: MEDICAID

## 2025-05-28 VITALS
HEART RATE: 95 BPM | SYSTOLIC BLOOD PRESSURE: 96 MMHG | WEIGHT: 88 LBS | DIASTOLIC BLOOD PRESSURE: 58 MMHG | RESPIRATION RATE: 16 BRPM | HEIGHT: 63 IN | OXYGEN SATURATION: 97 % | BODY MASS INDEX: 15.59 KG/M2

## 2025-05-28 DIAGNOSIS — A04.8 OTHER SPECIFIED BACTERIAL INTESTINAL INFECTIONS: ICD-10-CM

## 2025-05-28 DIAGNOSIS — G14 POSTPOLIO SYNDROME: ICD-10-CM

## 2025-05-28 DIAGNOSIS — M54.16 RADICULOPATHY, LUMBAR REGION: ICD-10-CM

## 2025-05-28 DIAGNOSIS — M16.0 BILATERAL PRIMARY OSTEOARTHRITIS OF HIP: ICD-10-CM

## 2025-05-28 DIAGNOSIS — Z98.89 OTHER SPECIFIED POSTPROCEDURAL STATES: Chronic | ICD-10-CM

## 2025-05-28 DIAGNOSIS — Z00.00 ENCOUNTER FOR GENERAL ADULT MEDICAL EXAMINATION W/OUT ABNORMAL FINDINGS: ICD-10-CM

## 2025-05-28 DIAGNOSIS — M85.859 OTHER SPECIFIED DISORDERS OF BONE DENSITY AND STRUCTURE, UNSPECIFIED THIGH: ICD-10-CM

## 2025-05-28 PROCEDURE — 99396 PREV VISIT EST AGE 40-64: CPT

## 2025-05-29 DIAGNOSIS — M54.16 RADICULOPATHY, LUMBAR REGION: ICD-10-CM

## 2025-05-29 DIAGNOSIS — A04.8 OTHER SPECIFIED BACTERIAL INTESTINAL INFECTIONS: ICD-10-CM

## 2025-05-29 DIAGNOSIS — G14 POSTPOLIO SYNDROME: ICD-10-CM

## 2025-05-29 DIAGNOSIS — M16.0 BILATERAL PRIMARY OSTEOARTHRITIS OF HIP: ICD-10-CM

## 2025-05-29 DIAGNOSIS — Z00.00 ENCOUNTER FOR GENERAL ADULT MEDICAL EXAMINATION WITHOUT ABNORMAL FINDINGS: ICD-10-CM

## 2025-05-29 DIAGNOSIS — Z23 ENCOUNTER FOR IMMUNIZATION: ICD-10-CM

## 2025-05-29 DIAGNOSIS — M85.859 OTHER SPECIFIED DISORDERS OF BONE DENSITY AND STRUCTURE, UNSPECIFIED THIGH: ICD-10-CM

## 2025-06-11 ENCOUNTER — NON-APPOINTMENT (OUTPATIENT)
Age: 60
End: 2025-06-11

## 2025-07-05 ENCOUNTER — EMERGENCY (EMERGENCY)
Facility: HOSPITAL | Age: 60
LOS: 1 days | End: 2025-07-05
Attending: EMERGENCY MEDICINE | Admitting: EMERGENCY MEDICINE
Payer: MEDICAID

## 2025-07-05 VITALS
RESPIRATION RATE: 18 BRPM | TEMPERATURE: 98 F | SYSTOLIC BLOOD PRESSURE: 113 MMHG | HEART RATE: 68 BPM | DIASTOLIC BLOOD PRESSURE: 60 MMHG | OXYGEN SATURATION: 99 %

## 2025-07-05 VITALS
OXYGEN SATURATION: 98 % | TEMPERATURE: 98 F | DIASTOLIC BLOOD PRESSURE: 64 MMHG | RESPIRATION RATE: 17 BRPM | HEART RATE: 81 BPM | SYSTOLIC BLOOD PRESSURE: 106 MMHG

## 2025-07-05 DIAGNOSIS — Z98.89 OTHER SPECIFIED POSTPROCEDURAL STATES: Chronic | ICD-10-CM

## 2025-07-05 PROCEDURE — 99284 EMERGENCY DEPT VISIT MOD MDM: CPT

## 2025-07-05 RX ORDER — ACETAMINOPHEN 500 MG/5ML
650 LIQUID (ML) ORAL ONCE
Refills: 0 | Status: COMPLETED | OUTPATIENT
Start: 2025-07-05 | End: 2025-07-05

## 2025-07-05 RX ORDER — LIDOCAINE HYDROCHLORIDE 20 MG/ML
1 JELLY TOPICAL ONCE
Refills: 0 | Status: COMPLETED | OUTPATIENT
Start: 2025-07-05 | End: 2025-07-05

## 2025-07-05 RX ORDER — LIDOCAINE HYDROCHLORIDE 20 MG/ML
1 JELLY TOPICAL
Qty: 1 | Refills: 0
Start: 2025-07-05 | End: 2025-07-09

## 2025-07-05 RX ORDER — KETOROLAC TROMETHAMINE 30 MG/ML
15 INJECTION, SOLUTION INTRAMUSCULAR; INTRAVENOUS ONCE
Refills: 0 | Status: DISCONTINUED | OUTPATIENT
Start: 2025-07-05 | End: 2025-07-05

## 2025-07-05 RX ADMIN — KETOROLAC TROMETHAMINE 15 MILLIGRAM(S): 30 INJECTION, SOLUTION INTRAMUSCULAR; INTRAVENOUS at 14:24

## 2025-07-05 RX ADMIN — Medication 650 MILLIGRAM(S): at 14:24

## 2025-07-05 RX ADMIN — LIDOCAINE HYDROCHLORIDE 1 PATCH: 20 JELLY TOPICAL at 14:24

## 2025-07-05 NOTE — ED PROVIDER NOTE - CCCP TRG CHIEF CMPLNT
Erivedge Counseling- I discussed with the patient the risks of Erivedge including but not limited to nausea, vomiting, diarrhea, constipation, weight loss, changes in the sense of taste, decreased appetite, muscle spasms, and hair loss.  The patient verbalized understanding of the proper use and possible adverse effects of Erivedge.  All of the patient's questions and concerns were addressed. back pain general

## 2025-07-05 NOTE — ED ADULT NURSE NOTE - NSFALLRISKINTERV_ED_ALL_ED

## 2025-07-05 NOTE — ED PROVIDER NOTE - ATTENDING APP SHARED VISIT CONTRIBUTION OF CARE
Patient is a 60-year-old female with a history of polio, uses a right leg brace along with a walker/wheelchair at baseline here for evaluation of low back pain.  Patient reports that she has had back pain for about 1 year.  She is following up with an outpatient spine physician.  She is on tizanidine and diclofenac and states that she has been taking this without any relief of symptoms.  Patient states that she has generalized weakness in her legs which is unchanged.  She denies any change in bowel or bladder habits.  Patient denies any urinary symptoms.  No fevers, chills.  She denies any chest pain or shortness of breath.  No abdominal pain.      VS noted  Gen. no acute distress, Non toxic   HEENT: EOMI, mmm  spine:  no midline C–T–L-spine tenderness  Lungs: CTAB/L no C/ W /R   CVS: RRR   Abd; Soft non tender, non distended   Ext: no edema, right leg in brace  Skin: no rash  Neuro AAOx3, face symmetrical, upper extremity strength equal, bilateral lower extremities weakness 3/5 with limited range of motion which is her baseline, clear speech  a/p:  low back pain–patient reports  she is following with her spine physician.  She states she is here for pain control.  She reports that she has had outpatient MRIs.  She reports there is no change in her weakness.  Plan for pain medication and reassessment.  Patient is here with her son.  He states that they can follow-up with her spine physician this week.  - Ilan WHITLOCK

## 2025-07-05 NOTE — ED PROVIDER NOTE - OBJECTIVE STATEMENT
60-year-old female with past medical history polio (uses right leg brace, walker/wheelchair at baseline, bilateral lower extremity weakness at baseline) presenting to the ER with low back pain.  Patient reports that low back pain initially began 1 year ago but has been worsening over the past month.  Patient is taking tizanidine and diclofenac without relief of symptoms.  Patient had an MRI performed in December 2020 for which shows "moderate disc bulging with endplate osteophyte formation mild endplate marrow edema at L2-L3 with moderate left and mild right foraminal narrowing."  Patient reports since onset his pain she has generalized weakness but denies acute worsening of weakness of lower extremities.  Patient denies fever/chills, saddle anesthesia, bowel or bladder incontinence, chest pain, shortness of breath, abdominal pain, nausea, vomiting, diarrhea, recent travel or illness or any other concerns

## 2025-07-05 NOTE — ED PROVIDER NOTE - PHYSICAL EXAMINATION
MSK: no midline spinal tenderness, FROM of spine  RLE/LLE strength 3/5 in dorsiflexion/plantarflexion, unable to perform straight leg raise which pt states is her baseline  sensation intact and equal in b/l LE

## 2025-07-05 NOTE — ED ADULT TRIAGE NOTE - CHIEF COMPLAINT QUOTE
Pt c/o increasing lower back pain with radiation to buttocks. Pt had MRI 1 year ago showing lumbar radiculopathy. Pt states pain has been worsening since then despite taking prescribed medication. Past hx Polio, cleft pallet

## 2025-07-05 NOTE — ED PROVIDER NOTE - NSFOLLOWUPINSTRUCTIONS_ED_ALL_ED_FT
Follow-up with your primary care doctor within 1 week  Follow-up with your spine specialist within 1 week  Continue taking medications diclofenac and tizanidine as previously prescribed to you  You can also take Tylenol 650 mg every 6 hours  Apply lidocaine patch to low back, leave patch on for 12 hours and remove for 12 hours before applying any patch  Return to the ER with any worsening or concerning symptoms worsening back pain, worsening weakness in either of your legs, numbness, bowel or bladder incontinence or any other concerns

## 2025-07-05 NOTE — ED ADULT NURSE NOTE - OBJECTIVE STATEMENT
Patient received in wellness, exam room 1. Patient A&Ox3 and ambulatory with assistance at baseline. Patient presents to the ED c/o acute on chronic lower back pain with radiation to b/l legs. phx polio, cleft palate. Patient denies headache, dizziness, lightheadedness, nausea/vomiting, fever/chills. Patient offers no complaints at this time. Respirations even and unlabored, no signs/symptoms of acute distress; patient denies dyspnea, shortness of breath, and chest pain. Patient is stable at this time.

## 2025-07-05 NOTE — ED PROVIDER NOTE - CLINICAL SUMMARY MEDICAL DECISION MAKING FREE TEXT BOX
60-year-old female with past medical history polio (uses right leg brace, walker/wheelchair at baseline, bilateral lower extremity weakness at baseline) presenting to the ER with low back pain.  Patient reports that low back pain initially began 1 year ago but has been worsening over the past month.  Patient is taking tizanidine and diclofenac without relief of symptoms.  Patient had an MRI performed in December 2020 for which shows "moderate disc bulging with endplate osteophyte formation mild endplate marrow edema at L2-L3 with moderate left and mild right foraminal narrowing."  Patient reports since onset his pain she has generalized weakness but denies acute worsening of weakness of lower extremities.  Patient denies fever/chills, saddle anesthesia, bowel or bladder incontinence, chest pain, shortness of breath, abdominal pain, nausea, vomiting, diarrhea, recent travel or illness or any other concerns 60-year-old female with past medical history polio (uses right leg brace, walker/wheelchair at baseline, bilateral lower extremity weakness at baseline) presenting to the ER with low back pain.  Patient reports that low back pain initially began 1 year ago but has been worsening over the past month.  Patient is taking tizanidine and diclofenac without relief of symptoms.  Patient had an MRI performed in December 2020 for which shows "moderate disc bulging with endplate osteophyte formation mild endplate marrow edema at L2-L3 with moderate left and mild right foraminal narrowing."  Patient reports since onset his pain she has generalized weakness but denies acute worsening of weakness of lower extremities.  Patient denies fever/chills, saddle anesthesia, bowel or bladder incontinence, chest pain, shortness of breath, abdominal pain, nausea, vomiting, diarrhea, recent travel or illness or any other concerns. On exam pt is well appearing, afebrile, no midline spinal tenderness, FROM of spine, RLE/LLE strength 3/5 in dorsiflexion/plantarflexion, unable to perform straight leg raise which pt states is her baseline  sensation intact and equal in b/l LE, pt reports weakness in her legs is at her baseline, not worsening. Concern for acute on chronic low back pain, no indication for emergent MRI imaging in ED at this time.

## 2025-07-05 NOTE — ED PROVIDER NOTE - NSICDXFAMILYHX_GEN_ALL_CORE_FT
FAMILY HISTORY:  Family history of hypertension in mother  No pertinent family history in first degree relatives

## 2025-07-05 NOTE — ED PROVIDER NOTE - PATIENT PORTAL LINK FT
You can access the FollowMyHealth Patient Portal offered by Health system by registering at the following website: [email or web address]. By joining [facility]’s FollowMyHealth portal, you will also be able to view your health information using other applications (apps) compatible with our system.

## 2025-07-07 RX ORDER — LIDOCAINE HYDROCHLORIDE 20 MG/ML
1 JELLY TOPICAL
Qty: 1 | Refills: 0
Start: 2025-07-07 | End: 2025-07-11

## 2025-07-07 NOTE — ED POST DISCHARGE NOTE - ADDITIONAL DOCUMENTATION
Received call from patient requesting rx to be sent to different pharmacy.  Resent lidocaine patch to preferred pharmacy.

## 2025-07-31 ENCOUNTER — APPOINTMENT (OUTPATIENT)
Dept: MAMMOGRAPHY | Facility: IMAGING CENTER | Age: 60
End: 2025-07-31
Payer: MEDICAID

## 2025-07-31 ENCOUNTER — OUTPATIENT (OUTPATIENT)
Dept: OUTPATIENT SERVICES | Facility: HOSPITAL | Age: 60
LOS: 1 days | End: 2025-07-31
Payer: MEDICAID

## 2025-07-31 DIAGNOSIS — Z00.8 ENCOUNTER FOR OTHER GENERAL EXAMINATION: ICD-10-CM

## 2025-07-31 DIAGNOSIS — Z98.89 OTHER SPECIFIED POSTPROCEDURAL STATES: Chronic | ICD-10-CM

## 2025-07-31 PROCEDURE — 77067 SCR MAMMO BI INCL CAD: CPT

## 2025-07-31 PROCEDURE — 77067 SCR MAMMO BI INCL CAD: CPT | Mod: 26

## 2025-07-31 PROCEDURE — 77063 BREAST TOMOSYNTHESIS BI: CPT

## 2025-07-31 PROCEDURE — 77063 BREAST TOMOSYNTHESIS BI: CPT | Mod: 26

## 2025-08-28 ENCOUNTER — NON-APPOINTMENT (OUTPATIENT)
Age: 60
End: 2025-08-28

## (undated) DEVICE — BIOPSY FORCEP RADIAL JAW 4 STANDARD WITH NEEDLE

## (undated) DEVICE — DRSG 2X2

## (undated) DEVICE — CONTAINER FORMALIN 80ML YELLOW

## (undated) DEVICE — DRSG BANDAID 0.75X3"

## (undated) DEVICE — CLAMP BX HOT RAD JAW 3

## (undated) DEVICE — GOWN LG

## (undated) DEVICE — BITE BLOCK ADULT 20 X 27MM (GREEN)

## (undated) DEVICE — CATH IV SAFE BC 22G X 1" (BLUE)

## (undated) DEVICE — TUBING SUCTION NONCONDUCTIVE 6MM X 12FT

## (undated) DEVICE — BASIN EMESIS 10IN GRADUATED MAUVE

## (undated) DEVICE — BIOPSY FORCEP COLD DISP

## (undated) DEVICE — ELCTR ECG CONDUCTIVE ADHESIVE

## (undated) DEVICE — TUBING IV SET GRAVITY 3Y 100" MACRO

## (undated) DEVICE — UNDERPAD LINEN SAVER 17 X 24"

## (undated) DEVICE — PACK IV START WITH CHG

## (undated) DEVICE — SALIVA EJECTOR (BLUE)

## (undated) DEVICE — TUBING MEDI-VAC W MAXIGRIP CONNECTORS 1/4"X6'

## (undated) DEVICE — ELCTR GROUNDING PAD ADULT COVIDIEN

## (undated) DEVICE — DENTURE CUP PINK

## (undated) DEVICE — LUBRICATING JELLY HR ONE SHOT 3G

## (undated) DEVICE — CONTAINER FORMALIN 10% 20ML

## (undated) DEVICE — DRSG CURITY GAUZE SPONGE 4 X 4" 12-PLY NON-STERILE